# Patient Record
Sex: FEMALE | Race: WHITE | Employment: OTHER | URBAN - METROPOLITAN AREA
[De-identification: names, ages, dates, MRNs, and addresses within clinical notes are randomized per-mention and may not be internally consistent; named-entity substitution may affect disease eponyms.]

---

## 2017-02-06 PROBLEM — R07.9 CHEST PAIN: Status: ACTIVE | Noted: 2017-02-06

## 2017-07-03 ENCOUNTER — APPOINTMENT (OUTPATIENT)
Dept: GENERAL RADIOLOGY | Age: 77
End: 2017-07-03
Payer: MEDICARE

## 2017-07-03 ENCOUNTER — HOSPITAL ENCOUNTER (EMERGENCY)
Age: 77
Discharge: HOME | End: 2017-07-03
Attending: STUDENT IN AN ORGANIZED HEALTH CARE EDUCATION/TRAINING PROGRAM
Payer: MEDICARE

## 2017-07-03 VITALS
RESPIRATION RATE: 16 BRPM | HEART RATE: 86 BPM | DIASTOLIC BLOOD PRESSURE: 67 MMHG | BODY MASS INDEX: 31.89 KG/M2 | OXYGEN SATURATION: 97 % | TEMPERATURE: 98 F | SYSTOLIC BLOOD PRESSURE: 101 MMHG | WEIGHT: 180 LBS | HEIGHT: 63 IN

## 2017-07-03 DIAGNOSIS — M54.6 ACUTE MIDLINE THORACIC BACK PAIN: Primary | ICD-10-CM

## 2017-07-03 PROCEDURE — 99283 EMERGENCY DEPT VISIT LOW MDM: CPT | Performed by: PHYSICIAN ASSISTANT

## 2017-07-03 PROCEDURE — 72070 X-RAY EXAM THORAC SPINE 2VWS: CPT

## 2017-07-03 RX ORDER — CYCLOBENZAPRINE HCL 5 MG
5 TABLET ORAL 2 TIMES DAILY
Qty: 14 TAB | Refills: 0 | Status: SHIPPED | OUTPATIENT
Start: 2017-07-03 | End: 2017-07-10

## 2017-07-03 NOTE — ED TRIAGE NOTES
Per EMS, patient fell Friday in her yard. Denies LOC. C/o upper back pain currently. Pain worse with movement.

## 2017-07-03 NOTE — ED PROVIDER NOTES
Patient is a 76 y.o. female presenting with back pain. The history is provided by the patient. Back Pain    This is a new problem. Episode onset: 4 days ago  The problem has not changed since onset. The problem occurs constantly. Patient reports not work related injury. The pain is associated with a fall. The pain is present in the thoracic spine. The quality of the pain is described as aching. The pain does not radiate. The pain is at a severity of 6/10. The pain is moderate. The symptoms are aggravated by certain positions. Worse during: worse with  movement  Pertinent negatives include no paresis, no tingling and no weakness. She has tried bed rest (tylenol ) for the symptoms. The treatment provided mild relief. Risk factors include a sedentary lifestyle.         Past Medical History:   Diagnosis Date    Anxiety     no treatment    Arthritis     generalized    Chronic kidney disease     monitored by Nephrologist    Chronic pain     back    Claustrophobia     Hypertension     managed with medication     Irregular heart beat      skips a beat \" every now and then\"-- no meds    Obesity     BMI 35.9    Osteoporosis     managed with medication     PUD (peptic ulcer disease)     no recent episodes, changed medication routine with no further problems    Type 2 diabetes mellitus ([address] Dx     oral hypoglycemic/ Avg / low BS symptoms @ 88       Past Surgical History:   Procedure Laterality Date    HX AMPUTATION Left     cut great toe off with     HX BLEPHAROPLASTY Bilateral     HX CATARACT REMOVAL Bilateral     bilat, \"bubble\" in eye removed    HX  SECTION  /1964    x 2    HX HEMORRHOIDECTOMY      HX KNEE REPLACEMENT Bilateral     HX KYPHOPLASTY      HX WRIST FRACTURE TX Bilateral     surgical repair    [place] COLSC FLX W/REMOVAL LESION BY HOT BX FORCEPS  2013    polyps removed         Family History:   Problem Relation Age of Onset    Cancer Mother     Diabetes Mother     Heart Disease Father     Diabetes Sister    Mac Dacosta Sister     Diabetes Brother     Diabetes Sister     Diabetes Sister     Diabetes Sister     Cancer Sister     Malignant Hyperthermia Neg Hx     Pseudocholinesterase Deficiency Neg Hx     Delayed Awakening Neg Hx     Post-op Nausea/Vomiting Neg Hx     Emergence Delirium Neg Hx     Post-op Cognitive Dysfunction Neg Hx     Other Neg Hx        Social History     Social History    Marital status:      Spouse name: N/A    Number of children: N/A    Years of education: N/A     Occupational History    Not on file. Social History Main Topics    Smoking status: Former Smoker     Packs/day: 0.50     Years: 20.00     Quit date: 1/1/1976    Smokeless tobacco: Never Used    Alcohol use No    Drug use: No    Sexual activity: Not on file     Other Topics Concern    Not on file     Social History Narrative         ALLERGIES: Ibuprofen and Sulfa (sulfonamide antibiotics)    Review of Systems   Musculoskeletal: Positive for back pain. Neurological: Negative for tingling and weakness. All other systems reviewed and are negative. Vitals:    07/03/17 1410   BP: 100/64   Pulse: 94   Resp: 17   Temp: 98.3 °F (36.8 °C)   SpO2: 95%   Weight: 81.6 kg (180 lb)   Height: 5' 3\" (1.6 m)            Physical Exam   Constitutional: She is oriented to person, place, and time. She appears well-developed and well-nourished. No distress. HENT:   Head: Normocephalic and atraumatic. Eyes: Conjunctivae and EOM are normal. Pupils are equal, round, and reactive to light. Neck: Normal range of motion. Neck supple. Cardiovascular: Normal rate and regular rhythm. Pulmonary/Chest: Effort normal and breath sounds normal. No respiratory distress. She has no wheezes. Abdominal: Soft. Bowel sounds are normal.   Musculoskeletal: She exhibits tenderness. She exhibits no edema.         Back:    Neurological: She is alert and oriented to person, place, and time. Skin: Skin is warm. Nursing note and vitals reviewed.        MDM  Number of Diagnoses or Management Options  Diagnosis management comments: t spine x rays negative, pt has at home norco will add robaxin to curent meds and see pmd for recheck        Amount and/or Complexity of Data Reviewed  Tests in the radiology section of CPT®: ordered and reviewed  Review and summarize past medical records: yes    Risk of Complications, Morbidity, and/or Mortality  Presenting problems: moderate  Diagnostic procedures: low  Management options: low    Patient Progress  Patient progress: improved    ED Course       Procedures

## 2017-07-03 NOTE — DISCHARGE INSTRUCTIONS
Back Pain: Care Instructions  Your Care Instructions    Back pain has many possible causes. It is often related to problems with muscles and ligaments of the back. It may also be related to problems with the nerves, discs, or bones of the back. Moving, lifting, standing, sitting, or sleeping in an awkward way can strain the back. Sometimes you don't notice the injury until later. Arthritis is another common cause of back pain. Although it may hurt a lot, back pain usually improves on its own within several weeks. Most people recover in 12 weeks or less. Using good home treatment and being careful not to stress your back can help you feel better sooner. Follow-up care is a key part of your treatment and safety. Be sure to make and go to all appointments, and call your doctor if you are having problems. Its also a good idea to know your test results and keep a list of the medicines you take. How can you care for yourself at home? · Sit or lie in positions that are most comfortable and reduce your pain. Try one of these positions when you lie down:  ¨ Lie on your back with your knees bent and supported by large pillows. ¨ Lie on the floor with your legs on the seat of a sofa or chair. Blima Naegeli on your side with your knees and hips bent and a pillow between your legs. ¨ Lie on your stomach if it does not make pain worse. · Do not sit up in bed, and avoid soft couches and twisted positions. Bed rest can help relieve pain at first, but it delays healing. Avoid bed rest after the first day of back pain. · Change positions every 30 minutes. If you must sit for long periods of time, take breaks from sitting. Get up and walk around, or lie in a comfortable position. · Try using a heating pad on a low or medium setting for 15 to 20 minutes every 2 or 3 hours. Try a warm shower in place of one session with the heating pad. · You can also try an ice pack for 10 to 15 minutes every 2 to 3 hours.  Put a thin cloth between the ice pack and your skin. · Take pain medicines exactly as directed. ¨ If the doctor gave you a prescription medicine for pain, take it as prescribed. ¨ If you are not taking a prescription pain medicine, ask your doctor if you can take an over-the-counter medicine. · Take short walks several times a day. You can start with 5 to 10 minutes, 3 or 4 times a day, and work up to longer walks. Walk on level surfaces and avoid hills and stairs until your back is better. · Return to work and other activities as soon as you can. Continued rest without activity is usually not good for your back. · To prevent future back pain, do exercises to stretch and strengthen your back and stomach. Learn how to use good posture, safe lifting techniques, and proper body mechanics. When should you call for help? Call your doctor now or seek immediate medical care if:  · You have new or worsening numbness in your legs. · You have new or worsening weakness in your legs. (This could make it hard to stand up.)  · You lose control of your bladder or bowels. Watch closely for changes in your health, and be sure to contact your doctor if:  · Your pain gets worse. · You are not getting better after 2 weeks. Where can you learn more? Go to http://cesar-rohan.info/. Enter I748 in the search box to learn more about \"Back Pain: Care Instructions. \"  Current as of: March 21, 2017  Content Version: 11.3  © 9852-1111 [employer]. Care instructions adapted under license by [employer] (which disclaims liability or warranty for this information). If you have questions about a medical condition or this instruction, always ask your healthcare professional. [address] any warranty or liability for your use of this information.

## 2017-07-06 ENCOUNTER — HOSPITAL ENCOUNTER (OUTPATIENT)
Dept: MRI IMAGING | Age: 77
Discharge: HOME | End: 2017-07-06
Attending: NURSE PRACTITIONER
Payer: MEDICARE

## 2017-07-06 DIAGNOSIS — M54.50 LOW BACK PAIN: ICD-10-CM

## 2017-07-06 DIAGNOSIS — M48.50XA COMPRESSION FRACTURE OF SPINE (HCC): ICD-10-CM

## 2017-07-06 PROCEDURE — 72148 MRI LUMBAR SPINE W/O DYE: CPT

## 2017-08-07 PROBLEM — R41.3 INTERMITTENT MEMORY LOSS: Status: ACTIVE | Noted: 2017-08-07

## 2017-09-18 ENCOUNTER — HOSPITAL ENCOUNTER (OUTPATIENT)
Dept: CT IMAGING | Age: 77
Discharge: HOME | End: 2017-09-18
Attending: FAMILY MEDICINE
Payer: MEDICARE

## 2017-09-18 VITALS — HEIGHT: 63 IN | WEIGHT: 180 LBS | BODY MASS INDEX: 31.89 KG/M2

## 2017-09-18 DIAGNOSIS — N28.89 RENAL MASS, LEFT: ICD-10-CM

## 2017-09-18 LAB — CREAT BLD-MCNC: 1.4 MG/DL (ref 0.8–1.5)

## 2017-09-18 PROCEDURE — 74011250636 HC RX REV CODE- 250/636

## 2017-09-18 PROCEDURE — 74011000258 HC RX REV CODE- 258

## 2017-09-18 PROCEDURE — 74011636320 HC RX REV CODE- 636/320

## 2017-09-18 PROCEDURE — 74178 CT ABD&PLV WO CNTR FLWD CNTR: CPT

## 2017-09-18 PROCEDURE — 82565 ASSAY OF CREATININE: CPT

## 2017-09-18 RX ORDER — SODIUM CHLORIDE 0.9 % (FLUSH) 0.9 %
10 SYRINGE (ML) INJECTION
Status: COMPLETED | OUTPATIENT
Start: 2017-09-18 | End: 2017-09-18

## 2017-09-18 RX ORDER — SODIUM CHLORIDE 9 MG/ML
82 INJECTION, SOLUTION INTRAVENOUS ONCE
Status: COMPLETED | OUTPATIENT
Start: 2017-09-18 | End: 2017-09-18

## 2017-09-18 RX ADMIN — IOPAMIDOL 100 ML: 755 INJECTION, SOLUTION INTRAVENOUS at 11:57

## 2017-09-18 RX ADMIN — Medication 10 ML: at 11:57

## 2017-09-18 RX ADMIN — SODIUM CHLORIDE 100 ML: 900 INJECTION, SOLUTION INTRAVENOUS at 11:57

## 2017-09-18 RX ADMIN — SODIUM CHLORIDE 82 ML/HR: 900 INJECTION, SOLUTION INTRAVENOUS at 08:30

## 2017-09-18 NOTE — PROGRESS NOTES
Patient Discharged to home post CT IV hydration . Patient to private vehicle via wheelchair . Patient son to drive home . No c/o pain at time of dc .

## 2017-09-19 NOTE — PROGRESS NOTES
Advise pt creatinine clearance is 39, which while not perfect is still acceptable.   Will review with pt at office visit if needed, this lab was done for testing purposes (as CT scan)

## 2018-02-07 PROBLEM — E11.21 TYPE 2 DIABETES MELLITUS WITH NEPHROPATHY (HCC): Status: ACTIVE | Noted: 2018-02-07

## 2020-03-10 ENCOUNTER — HOSPITAL ENCOUNTER (OUTPATIENT)
Dept: MAMMOGRAPHY | Age: 80
Discharge: HOME | End: 2020-03-10
Attending: FAMILY MEDICINE
Payer: MEDICARE

## 2020-03-10 DIAGNOSIS — M81.0 OSTEOPOROSIS, UNSPECIFIED OSTEOPOROSIS TYPE, UNSPECIFIED PATHOLOGICAL FRACTURE PRESENCE: ICD-10-CM

## 2020-03-10 PROCEDURE — 77080 DXA BONE DENSITY AXIAL: CPT

## 2020-04-07 PROBLEM — M79.672 LEFT FOOT PAIN: Status: ACTIVE | Noted: 2020-04-07

## 2020-11-23 PROBLEM — R39.81 URINARY INCONTINENCE DUE TO COGNITIVE IMPAIRMENT: Status: ACTIVE | Noted: 2020-11-23

## 2020-11-23 PROBLEM — R32 INCONTINENCE IN FEMALE: Status: ACTIVE | Noted: 2020-11-23

## 2021-05-25 ENCOUNTER — HOSPITAL ENCOUNTER (OUTPATIENT)
Dept: MAMMOGRAPHY | Age: 81
Discharge: HOME | End: 2021-05-25
Attending: FAMILY MEDICINE
Payer: MEDICARE

## 2021-05-25 DIAGNOSIS — M81.0 OSTEOPOROSIS WITHOUT CURRENT PATHOLOGICAL FRACTURE, UNSPECIFIED OSTEOPOROSIS TYPE: ICD-10-CM

## 2021-05-25 PROCEDURE — 77080 DXA BONE DENSITY AXIAL: CPT

## 2021-09-07 PROBLEM — E11.22 CKD STAGE 3 DUE TO TYPE 2 DIABETES MELLITUS (HCC): Status: ACTIVE | Noted: 2021-09-07

## 2021-09-07 PROBLEM — N18.30 CKD STAGE 3 DUE TO TYPE 2 DIABETES MELLITUS (HCC): Status: ACTIVE | Noted: 2021-09-07

## 2021-09-07 PROBLEM — F51.01 PRIMARY INSOMNIA: Status: ACTIVE | Noted: 2021-09-07

## 2021-09-07 PROBLEM — M81.0 OSTEOPOROSIS: Status: ACTIVE | Noted: 2021-09-07

## 2021-09-07 PROBLEM — F41.9 ANXIETY: Status: ACTIVE | Noted: 2021-09-07

## 2021-09-07 PROBLEM — B37.2 YEAST INFECTION OF THE SKIN: Status: ACTIVE | Noted: 2021-09-07

## 2021-10-06 ENCOUNTER — HOSPITAL ENCOUNTER (OUTPATIENT)
Dept: LAB | Age: 81
Discharge: HOME | End: 2021-10-06
Payer: MEDICARE

## 2021-10-06 DIAGNOSIS — I10 PRIMARY HYPERTENSION: ICD-10-CM

## 2021-10-06 DIAGNOSIS — R60.0 LOCALIZED EDEMA: ICD-10-CM

## 2021-10-06 PROBLEM — I45.10 RBBB: Status: ACTIVE | Noted: 2021-10-06

## 2021-10-06 LAB
ALBUMIN SERPL-MCNC: 3.4 G/DL (ref 3.2–4.6)
ALBUMIN/GLOB SERPL: 0.8 {RATIO} (ref 1.2–3.5)
ALP SERPL-CCNC: 57 U/L (ref 50–136)
ALT SERPL-CCNC: 25 U/L (ref 12–65)
ANION GAP SERPL CALC-SCNC: 7 MMOL/L (ref 7–16)
AST SERPL-CCNC: 11 U/L (ref 15–37)
BILIRUB SERPL-MCNC: 0.2 MG/DL (ref 0.2–1.1)
BNP SERPL-MCNC: 315 PG/ML
BUN SERPL-MCNC: 20 MG/DL (ref 8–23)
CALCIUM SERPL-MCNC: 10.1 MG/DL (ref 8.3–10.4)
CHLORIDE SERPL-SCNC: 109 MMOL/L (ref 98–107)
CO2 SERPL-SCNC: 24 MMOL/L (ref 21–32)
CREAT SERPL-MCNC: 1.23 MG/DL (ref 0.6–1)
GLOBULIN SER CALC-MCNC: 4.3 G/DL (ref 2.3–3.5)
GLUCOSE SERPL-MCNC: 255 MG/DL (ref 65–100)
POTASSIUM SERPL-SCNC: 3.9 MMOL/L (ref 3.5–5.1)
PROT SERPL-MCNC: 7.7 G/DL (ref 6.3–8.2)
SODIUM SERPL-SCNC: 140 MMOL/L (ref 136–145)

## 2021-10-06 PROCEDURE — 36415 COLL VENOUS BLD VENIPUNCTURE: CPT

## 2021-10-06 PROCEDURE — 83880 ASSAY OF NATRIURETIC PEPTIDE: CPT

## 2021-10-06 PROCEDURE — 80053 COMPREHEN METABOLIC PANEL: CPT

## 2022-03-18 PROBLEM — R32 INCONTINENCE IN FEMALE: Status: ACTIVE | Noted: 2020-11-23

## 2022-03-18 PROBLEM — R39.81 URINARY INCONTINENCE DUE TO COGNITIVE IMPAIRMENT: Status: ACTIVE | Noted: 2020-11-23

## 2022-03-19 PROBLEM — F41.9 ANXIETY: Status: ACTIVE | Noted: 2021-09-07

## 2022-03-19 PROBLEM — E11.22 CKD STAGE 3 DUE TO TYPE 2 DIABETES MELLITUS (HCC): Status: ACTIVE | Noted: 2021-09-07

## 2022-03-19 PROBLEM — R41.3 INTERMITTENT MEMORY LOSS: Status: ACTIVE | Noted: 2017-08-07

## 2022-03-19 PROBLEM — M81.0 OSTEOPOROSIS: Status: ACTIVE | Noted: 2021-09-07

## 2022-03-19 PROBLEM — F51.01 PRIMARY INSOMNIA: Status: ACTIVE | Noted: 2021-09-07

## 2022-03-19 PROBLEM — E11.21 TYPE 2 DIABETES MELLITUS WITH NEPHROPATHY (HCC): Status: ACTIVE | Noted: 2018-02-07

## 2022-03-19 PROBLEM — I45.10 RBBB: Status: ACTIVE | Noted: 2021-10-06

## 2022-03-19 PROBLEM — B37.2 YEAST INFECTION OF THE SKIN: Status: ACTIVE | Noted: 2021-09-07

## 2022-03-19 PROBLEM — M79.672 LEFT FOOT PAIN: Status: ACTIVE | Noted: 2020-04-07

## 2022-03-19 PROBLEM — R07.9 CHEST PAIN: Status: ACTIVE | Noted: 2017-02-06

## 2022-03-19 PROBLEM — N18.30 CKD STAGE 3 DUE TO TYPE 2 DIABETES MELLITUS (HCC): Status: ACTIVE | Noted: 2021-09-07

## 2022-04-21 PROCEDURE — 96372 THER/PROPH/DIAG INJ SC/IM: CPT

## 2022-04-21 PROCEDURE — 99284 EMERGENCY DEPT VISIT MOD MDM: CPT

## 2022-04-21 PROCEDURE — 99281 EMR DPT VST MAYX REQ PHY/QHP: CPT

## 2022-04-22 ENCOUNTER — HOSPITAL ENCOUNTER (EMERGENCY)
Age: 82
Discharge: HOME | End: 2022-04-22
Attending: EMERGENCY MEDICINE
Payer: MEDICARE

## 2022-04-22 ENCOUNTER — APPOINTMENT (OUTPATIENT)
Dept: GENERAL RADIOLOGY | Age: 82
End: 2022-04-22
Attending: EMERGENCY MEDICINE
Payer: MEDICARE

## 2022-04-22 VITALS
OXYGEN SATURATION: 96 % | WEIGHT: 180 LBS | DIASTOLIC BLOOD PRESSURE: 82 MMHG | RESPIRATION RATE: 20 BRPM | SYSTOLIC BLOOD PRESSURE: 165 MMHG | HEART RATE: 101 BPM | BODY MASS INDEX: 31.89 KG/M2 | HEIGHT: 63 IN | TEMPERATURE: 98.3 F
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
## 2022-04-22 LAB
GLUCOSE BLD STRIP.AUTO-MCNC: 345 MG/DL (ref 65–100)
GLUCOSE BLD STRIP.AUTO-MCNC: 356 MG/DL (ref 65–100)
SERVICE CMNT-IMP: ABNORMAL
SERVICE CMNT-IMP: ABNORMAL

## 2022-04-22 PROCEDURE — 73060 X-RAY EXAM OF HUMERUS: CPT

## 2022-04-22 PROCEDURE — 74011636637 HC RX REV CODE- 636/637: Performed by: EMERGENCY MEDICINE

## 2022-04-22 PROCEDURE — 72040 X-RAY EXAM NECK SPINE 2-3 VW: CPT

## 2022-04-22 PROCEDURE — 72070 X-RAY EXAM THORAC SPINE 2VWS: CPT

## 2022-04-22 PROCEDURE — 73522 X-RAY EXAM HIPS BI 3-4 VIEWS: CPT

## 2022-04-22 PROCEDURE — 71111 X-RAY EXAM RIBS/CHEST4/> VWS: CPT

## 2022-04-22 PROCEDURE — 72100 X-RAY EXAM L-S SPINE 2/3 VWS: CPT

## 2022-04-22 PROCEDURE — 82962 GLUCOSE BLOOD TEST: CPT

## 2022-04-22 PROCEDURE — 74011250637 HC RX REV CODE- 250/637: Performed by: EMERGENCY MEDICINE

## 2022-04-22 RX ORDER — HYDROCODONE BITARTRATE AND ACETAMINOPHEN 10; 325 MG/1; MG/1
1 TABLET ORAL ONCE
Status: COMPLETED | OUTPATIENT
Start: 2022-04-22 | End: 2022-04-22

## 2022-04-22 RX ORDER — INSULIN GLARGINE 100 [IU]/ML
40 INJECTION, SOLUTION SUBCUTANEOUS
Status: COMPLETED | OUTPATIENT
Start: 2022-04-22 | End: 2022-04-22

## 2022-04-22 RX ADMIN — HYDROCODONE BITARTRATE AND ACETAMINOPHEN 1 TABLET: 10; 325 TABLET ORAL at 07:23

## 2022-04-22 RX ADMIN — INSULIN GLARGINE 40 UNITS: 100 INJECTION, SOLUTION SUBCUTANEOUS at 07:44

## 2022-04-22 NOTE — ED TRIAGE NOTES
Pt presents to the ED c/o fall while walking with her walker. States just mistepped, falling onto the ground and denies blood thinner use. Pt denies hitting and LOC. States it took her 3 hours to get into the house to call someone.
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
## 2022-04-22 NOTE — ED NOTES
I have reviewed discharge instructions with the patient. The patient verbalized understanding. Patient left ED via Discharge Method: stretcher to Home with [facility] for questions and clarification provided. Patient given 0 scripts. To continue your aftercare when you leave the hospital, you may receive an automated call from our care team to check in on how you are doing. This is a free service and part of our promise to provide the best care and service to meet your aftercare needs.  If you have questions, or wish to unsubscribe from this service please call [phone]. Thank you for Choosing our [address] [place] [address] Emergency Department.

## 2022-06-15 ENCOUNTER — TELEPHONE (OUTPATIENT)
Dept: PRIMARY CARE CLINIC | Facility: CLINIC | Age: 82
End: 2022-06-15

## 2022-06-15 DIAGNOSIS — E11.21 TYPE 2 DIABETES MELLITUS WITH NEPHROPATHY (HCC): Primary | ICD-10-CM

## 2022-06-15 NOTE — TELEPHONE ENCOUNTER
----- Message from Kai Finley sent at 6/14/2022  9:18 AM EDT -----  Subject: Refill Request    QUESTIONS  Name of Medication? insulin detemir (LEVEMIR) 100 UNIT/ML injection pen  Patient-reported dosage and instructions? 100 unit/ml injection pen   How many days do you have left? 1  Preferred Pharmacy? [facility]Y #[id number]  Pharmacy phone number (if available)? [phone]  ---------------------------------------------------------------------------  --------------,  Name of Medication? diazePAM (VALIUM) 5 MG tablet  Patient-reported dosage and instructions? 5 mg every 12 hours   How many days do you have left? 10  Preferred Pharmacy? [facility]/PHARMACY #[id number]  Pharmacy phone number (if available)? [phone]  ---------------------------------------------------------------------------  --------------,  Name of Medication? temazepam (RESTORIL) 15 MG capsule  Patient-reported dosage and instructions? 15 mg once at night   How many days do you have left? 7  Preferred Pharmacy? [facility]/PHARMACY #[id number]  Pharmacy phone number (if available)? [phone]  Additional Information for Provider? pt is needing refills stated that she   will run out before her appointment as well need test strips   ---------------------------------------------------------------------------  --------------  CALL BACK INFO  What is the best way for the office to contact you? OK to leave message on   voicemail  Preferred Call Back Phone Number? [phone]  ---------------------------------------------------------------------------  --------------  SCRIPT ANSWERS  Relationship to Patient?  Self

## 2022-06-15 NOTE — TELEPHONE ENCOUNTER
Insulin sent. Patient has an appt tomorrow. The other meds patient is requesting are not completely out. Will refill those tomorrow at appt.

## 2022-06-16 ENCOUNTER — OFFICE VISIT (OUTPATIENT)
Dept: PRIMARY CARE CLINIC | Facility: CLINIC | Age: 82
End: 2022-06-16
Payer: MEDICARE

## 2022-06-16 VITALS
HEIGHT: 63 IN | SYSTOLIC BLOOD PRESSURE: 160 MMHG | OXYGEN SATURATION: 97 % | HEART RATE: 59 BPM | DIASTOLIC BLOOD PRESSURE: 75 MMHG | WEIGHT: 180 LBS | TEMPERATURE: 97.1 F | BODY MASS INDEX: 31.89 KG/M2

## 2022-06-16 DIAGNOSIS — F51.01 PRIMARY INSOMNIA: ICD-10-CM

## 2022-06-16 DIAGNOSIS — M81.0 OSTEOPOROSIS, UNSPECIFIED OSTEOPOROSIS TYPE, UNSPECIFIED PATHOLOGICAL FRACTURE PRESENCE: ICD-10-CM

## 2022-06-16 DIAGNOSIS — F41.9 ANXIETY: ICD-10-CM

## 2022-06-16 DIAGNOSIS — E55.9 VITAMIN D DEFICIENCY: ICD-10-CM

## 2022-06-16 DIAGNOSIS — I10 PRIMARY HYPERTENSION: ICD-10-CM

## 2022-06-16 DIAGNOSIS — Z89.412 STATUS POST AMPUTATION OF LEFT GREAT TOE (HCC): ICD-10-CM

## 2022-06-16 DIAGNOSIS — E11.21 TYPE 2 DIABETES MELLITUS WITH NEPHROPATHY (HCC): Primary | ICD-10-CM

## 2022-06-16 DIAGNOSIS — R41.3 INTERMITTENT MEMORY LOSS: ICD-10-CM

## 2022-06-16 DIAGNOSIS — M79.601 RIGHT ARM PAIN: ICD-10-CM

## 2022-06-16 DIAGNOSIS — G89.29 OTHER CHRONIC PAIN: ICD-10-CM

## 2022-06-16 DIAGNOSIS — N18.30 CKD STAGE 3 DUE TO TYPE 2 DIABETES MELLITUS (HCC): ICD-10-CM

## 2022-06-16 DIAGNOSIS — E11.22 CKD STAGE 3 DUE TO TYPE 2 DIABETES MELLITUS (HCC): ICD-10-CM

## 2022-06-16 DIAGNOSIS — E11.21 TYPE 2 DIABETES MELLITUS WITH NEPHROPATHY (HCC): ICD-10-CM

## 2022-06-16 DIAGNOSIS — I45.10 RBBB: ICD-10-CM

## 2022-06-16 PROCEDURE — 99214 OFFICE O/P EST MOD 30 MIN: CPT | Performed by: FAMILY MEDICINE

## 2022-06-16 PROCEDURE — 3046F HEMOGLOBIN A1C LEVEL >9.0%: CPT | Performed by: FAMILY MEDICINE

## 2022-06-16 PROCEDURE — 1123F ACP DISCUSS/DSCN MKR DOCD: CPT | Performed by: FAMILY MEDICINE

## 2022-06-16 RX ORDER — QUINAPRIL 40 MG/1
40 TABLET ORAL DAILY
Qty: 90 TABLET | Refills: 3 | Status: SHIPPED | OUTPATIENT
Start: 2022-06-16

## 2022-06-16 RX ORDER — LANCETS 30 GAUGE
EACH MISCELLANEOUS
Qty: 100 EACH | Refills: 11 | Status: SHIPPED | OUTPATIENT
Start: 2022-06-16

## 2022-06-16 RX ORDER — TEMAZEPAM 15 MG/1
15 CAPSULE ORAL NIGHTLY PRN
Qty: 30 CAPSULE | Refills: 2 | Status: SHIPPED | OUTPATIENT
Start: 2022-06-16 | End: 2022-10-04 | Stop reason: SDUPTHER

## 2022-06-16 RX ORDER — DIAZEPAM 5 MG/1
5 TABLET ORAL EVERY 12 HOURS PRN
Qty: 60 TABLET | Refills: 2 | Status: SHIPPED | OUTPATIENT
Start: 2022-06-16 | End: 2022-09-16 | Stop reason: SDUPTHER

## 2022-06-16 RX ORDER — AMLODIPINE BESYLATE 2.5 MG/1
2.5 TABLET ORAL DAILY
Qty: 90 TABLET | Refills: 1 | Status: SHIPPED | OUTPATIENT
Start: 2022-06-16 | End: 2022-09-16 | Stop reason: ALTCHOICE

## 2022-06-16 RX ORDER — MAGNESIUM OXIDE 400 MG/1
400 TABLET ORAL 2 TIMES DAILY
Qty: 60 TABLET | Refills: 5 | Status: SHIPPED | OUTPATIENT
Start: 2022-06-16

## 2022-06-16 RX ORDER — ALENDRONATE SODIUM 70 MG/1
70 TABLET ORAL
Qty: 12 TABLET | Refills: 3 | Status: SHIPPED | OUTPATIENT
Start: 2022-06-16 | End: 2022-09-16 | Stop reason: ALTCHOICE

## 2022-06-16 ASSESSMENT — PATIENT HEALTH QUESTIONNAIRE - PHQ9
SUM OF ALL RESPONSES TO PHQ QUESTIONS 1-9: 0
SUM OF ALL RESPONSES TO PHQ QUESTIONS 1-9: 0
2. FEELING DOWN, DEPRESSED OR HOPELESS: 0
1. LITTLE INTEREST OR PLEASURE IN DOING THINGS: 0
SUM OF ALL RESPONSES TO PHQ9 QUESTIONS 1 & 2: 0
SUM OF ALL RESPONSES TO PHQ QUESTIONS 1-9: 0
SUM OF ALL RESPONSES TO PHQ QUESTIONS 1-9: 0

## 2022-06-16 NOTE — PROGRESS NOTES
Zoila Lucia MD  [address] [place] [address].  Denzel Parada  Ph No:  [phone]  Fax:  [phone]:  Chief Complaint   Patient presents with    Fall     Pt fell 2 months ago. Pt has RT arm pain and says she is always in pain.  Discuss Labs     Pt wants to have labs drawn today. HISTORY OF PRESENT ILLNESS:  Ms. Sony Prieto is a 78 y.o. female. Pt presents with caregiver for follow up visit, with multiple medical concerns. Pt is a diabetic and reports she is almost out of insulin and some insulin supplies. PT is sent diabetic test strips, delica type. Pt is also given levemir insulin pen for diabetes management, hgba1c lab is pending results. Pt is advised to go back to dermatologist for freezing of benign warts. Pt is given multiple medication refills as follows: restoril for sleep/insomnia. Pt is give accupril for BP management. BP elevated at 160/75. Adding amlodipine 2.5mg po daily dose for BP management. Pt is given valium for general anxiety disorder, taking twice daily, stable. Pt reports weakness in right arm, unable to lift arm without using left arm to . Pt reports pain in whole of right arm. Pt is sent to [facility] orthopaedic for help with right arm weakness, right arm pain, uncertain etiology. Pt says may have bursitis, had in past and had injection. Pt is to have labs drawn today, cmp, lipid, hgba1c, vit d, microalbumin, tsh, free t4. Results should be available in a few days to help with medication dosing (diabetes). Pt is to RTC in 3 months, telemed visit, due to transportation problems, and in 6 months will return to clinic, labs indicated, cmp, lipid, cbc, tsh, free t4, hgba1c, microalbumin, vit d.       HISTORY:  Allergies   Allergen Reactions    Ibuprofen Other (See Comments)     Stomach problems    Sulfa Antibiotics Other (See Comments)     Told allergic as child     Past Medical History:   Diagnosis Date  Anxiety     no treatment    Arthritis     generalized    Chronic kidney disease     monitored by Nephrologist    Chronic pain     back    Claustrophobia     Hypertension     managed with medication     Irregular heart beat      skips a beat \" every now and then\"-- no meds    Obesity     BMI 35.9    Osteoporosis     managed with medication     PUD (peptic ulcer disease)     no recent episodes, changed medication routine with no further problems    Type 2 diabetes mellitus ([facility] [address] Dx     oral hypoglycemic/ Avg / low BS symptoms @ 88     Past Surgical History:   Procedure Laterality Date    AMPUTATION Left     cut great toe off with     BLEPHAROPLASTY Bilateral     CATARACT REMOVAL Bilateral     bilat, \"bubble\" in eye removed     SECTION  1961/1964    x 2    COLSC FLX W/REMOVAL LESION BY HOT BX FORCEPS  2013    polyps removed    FIXATION KYPHOPLASTY      HEMORRHOID SURGERY      TOTAL KNEE ARTHROPLASTY Bilateral     WRIST FRACTURE SURGERY Bilateral     surgical repair     Family History   Problem Relation Age of Onset    Post-op Nausea/Vomiting Neg Hx     Pseudochol.  Deficiency Neg Hx     Delayed Awakening Neg Hx     Malig Hypertherm Neg Hx     Cancer Sister     Diabetes Sister     Diabetes Sister     Diabetes Sister     Diabetes Brother     Cancer Sister     Diabetes Sister     Heart Disease Father     Diabetes Mother     Cancer Mother     Other Neg Hx     Post-op Cognitive Dysfunction Neg Hx     Emergence Delirium Neg Hx      Social History     Socioeconomic History    Marital status:      Spouse name: Not on file    Number of children: Not on file    Years of education: Not on file    Highest education level: Not on file   Occupational History    Not on file   Tobacco Use    Smoking status: Former Smoker     Packs/day: 0.50     Quit date: 1976     Years since quittin.4    Smokeless tobacco: Never Used   Substance and Sexual Activity    Alcohol use: No    Drug use: No    Sexual activity: Not on file   Other Topics Concern    Not on file   Social History Narrative    Not on file     Social Determinants of Health     Financial Resource Strain:     Difficulty of Paying Living Expenses: Not on file   Food Insecurity:     Worried About Running Out of Food in the Last Year: Not on file    Emma of Food in the Last Year: Not on file   Transportation Needs:     Lack of Transportation (Medical): Not on file    Lack of Transportation (Non-Medical): Not on file   Physical Activity:     Days of Exercise per Week: Not on file    Minutes of Exercise per Session: Not on file   Stress:     Feeling of Stress : Not on file   Social Connections:     Frequency of Communication with Friends and Family: Not on file    Frequency of Social Gatherings with Friends and Family: Not on file    Attends Sabianism Services: Not on file    Active Member of [address] [place] [address] or Organizations: Not on file    Attends Club or Organization Meetings: Not on file    Marital Status: Not on file   Intimate Partner Violence:     Fear of Current or Ex-Partner: Not on file    Emotionally Abused: Not on file    Physically Abused: Not on file    Sexually Abused: Not on file   Housing Stability:     Unable to Pay for Housing in the Last Year: Not on file    Number of [place] in the Last Year: Not on file    Unstable Housing in the Last Year: Not on file     Current Outpatient Medications   Medication Sig Dispense Refill    temazepam (RESTORIL) 15 MG capsule Take 1 capsule by mouth nightly as needed for Sleep for up to 30 days. 30 capsule 2    quinapril (ACCUPRIL) 40 MG tablet Take 1 tablet by mouth daily 90 tablet 3    magnesium oxide (MAG-OX) 400 MG tablet Take 1 tablet by mouth 2 times daily 60 tablet 5    diazePAM (VALIUM) 5 MG tablet Take 1 tablet by mouth every 12 hours as needed for Anxiety for up to 30 days.  60 tablet 2    alendronate (FOSAMAX) 70 MG tablet Take 1 tablet by mouth every 7 days 12 tablet 3    insulin detemir (LEVEMIR) 100 UNIT/ML injection pen 35 units in am and 35 units in pm, please cancel any other levemir scripts. 15 pen 5    OneTouch Delica Lancets 53T MISC Use for three times daily glucose testing, icd-10 E11.8 100 each 11    amLODIPine (NORVASC) 2.5 MG tablet Take 1 tablet by mouth daily 90 tablet 1    acetaminophen (TYLENOL) 325 MG tablet Take 325 mg by mouth every 4 hours as needed      Calcium Carbonate-Vitamin D 600-200 MG-UNIT CAPS Take once daily for supplementation Indications: osteoporosis, a condition of weak bones      fluticasone (FLONASE) 50 MCG/ACT nasal spray 2 sprays by Nasal route daily      glipiZIDE (GLUCOTROL) 10 MG tablet Take 10 mg by mouth      naloxone 4 MG/0.1ML LIQD nasal spray Use 1 spray intranasally, then discard. Repeat with new spray every 2 min as needed for opioid overdose symptoms, alternating nostrils.  nystatin (MYCOSTATIN) 320389 UNIT/GM cream Apply topically 2 times daily       No current facility-administered medications for this visit. REVIEW OF SYSTEMS:  Review of systems is as indicated in HPI otherwise negative. PHYSICAL EXAM:  Vital Signs - BP (!) 160/75 (Site: Left Upper Arm, Position: Sitting, Cuff Size: Large Adult)   Pulse 59   Temp 97.1 °F (36.2 °C)   Ht 5' 3\" (1.6 m)   Wt 180 lb (81.6 kg)   SpO2 97%   BMI 31.89 kg/m²      Physical Exam  Vitals and nursing note reviewed. Constitutional:       General: She is in acute distress. Appearance: Normal appearance. She is obese. Comments: Pt presents in wheelchair, multiple medical concerns, see HPI, diabetes review. HENT:      Head: Normocephalic and atraumatic. Nose: Nose normal.      Mouth/Throat:      Mouth: Mucous membranes are moist.      Pharynx: Oropharynx is clear. Eyes:      Extraocular Movements: Extraocular movements intact.       Conjunctiva/sclera: Conjunctivae normal. Pupils: Pupils are equal, round, and reactive to light. Cardiovascular:      Rate and Rhythm: Normal rate and regular rhythm. Pulses: Normal pulses. Heart sounds: Normal heart sounds. Pulmonary:      Effort: Pulmonary effort is normal.      Comments: Coarse breath sounds, but otherwise mostly clear to ascultation bilaterally, pt is not on 02 therapy  Abdominal:      General: Bowel sounds are normal.      Palpations: Abdomen is soft. Comments: Obese, BMI 31.89, weight 180lbs   Musculoskeletal:      Cervical back: Normal range of motion and neck supple. Comments: Mostly wheelchair bound, bilateral lower leg weakness, some pain, right arm is not moving normally, arm pain, right shoulder weakness   Skin:     General: Skin is warm and dry. Capillary Refill: Capillary refill takes 2 to 3 seconds. Findings: Lesion present. Comments: Multiple benign warts across multiple areas, including arms, face and chest.   Neurological:      General: No focal deficit present. Mental Status: She is alert and oriented to person, place, and time. Motor: Weakness present. Gait: Gait abnormal.   Psychiatric:         Behavior: Behavior normal.         Thought Content: Thought content normal.         Judgment: Judgment normal.      Comments: Health anxiety, general anxiety disorder with mild depression             LABS  No results found for this visit on 06/16/22. IMPRESSION/PLAN     Diagnosis Orders   1. Type 2 diabetes mellitus with nephropathy (HCC)  CBC with Auto Differential    Comprehensive Metabolic Panel    Hemoglobin A1C    TSH    T4, Free    Lipid Panel    insulin detemir (LEVEMIR) 100 UNIT/ML injection pen    OneTouch Delica Lancets 34C MISC    DISCONTINUED: insulin detemir (LEVEMIR) 100 UNIT/ML injection pen   2. Vitamin D deficiency  Vitamin D 25 Hydroxy   3. Status post amputation of left great toe ([facility] [address]     4.  Right arm pain  External Referral To Orthopedic Surgery 5. Primary insomnia  temazepam (RESTORIL) 15 MG capsule   6. Anxiety  diazePAM (VALIUM) 5 MG tablet   7. Primary hypertension  quinapril (ACCUPRIL) 40 MG tablet    amLODIPine (NORVASC) 2.5 MG tablet   8. RBBB     9. CKD stage 3 due [facility]     10. Osteoporosis, unspecified osteoporosis type, unspecified pathological fracture presence  alendronate (FOSAMAX) 70 MG tablet   11. Other chronic pain  magnesium oxide (MAG-OX) 400 MG tablet   12. Intermittent memory loss         No follow-up provider specified. Jaymie Pineda MD            Dictated using voice recognition software.  Proofread, but unrecognized voice recognition errors may exist.

## 2022-06-17 LAB
25(OH)D3 SERPL-MCNC: 45.4 NG/ML (ref 30–100)
ALBUMIN SERPL-MCNC: 3.8 G/DL (ref 3.2–4.6)
ALBUMIN/GLOB SERPL: 1 {RATIO} (ref 1.2–3.5)
ALP SERPL-CCNC: 52 U/L (ref 50–136)
ALT SERPL-CCNC: 22 U/L (ref 12–65)
ANION GAP SERPL CALC-SCNC: 8 MMOL/L (ref 7–16)
AST SERPL-CCNC: 13 U/L (ref 15–37)
BASOPHILS # BLD: 0.1 K/UL (ref 0–0.2)
BASOPHILS NFR BLD: 1 % (ref 0–2)
BILIRUB SERPL-MCNC: 0.4 MG/DL (ref 0.2–1.1)
BUN SERPL-MCNC: 18 MG/DL (ref 8–23)
CALCIUM SERPL-MCNC: 10.6 MG/DL (ref 8.3–10.4)
CHLORIDE SERPL-SCNC: 105 MMOL/L (ref 98–107)
CHOLEST SERPL-MCNC: 299 MG/DL
CO2 SERPL-SCNC: 27 MMOL/L (ref 21–32)
CREAT SERPL-MCNC: 1.1 MG/DL (ref 0.6–1)
DIFFERENTIAL METHOD BLD: NORMAL
EOSINOPHIL # BLD: 0.2 K/UL (ref 0–0.8)
EOSINOPHIL NFR BLD: 2 % (ref 0.5–7.8)
ERYTHROCYTE [DISTWIDTH] IN BLOOD BY AUTOMATED COUNT: 13.2 % (ref 11.9–14.6)
EST. AVERAGE GLUCOSE BLD GHB EST-MCNC: 194 MG/DL
GLOBULIN SER CALC-MCNC: 4 G/DL (ref 2.3–3.5)
GLUCOSE SERPL-MCNC: 172 MG/DL (ref 65–100)
HBA1C MFR BLD: 8.4 % (ref 4.2–6.3)
HCT VFR BLD AUTO: 40.5 % (ref 35.8–46.3)
HDLC SERPL-MCNC: 43 MG/DL (ref 40–60)
HDLC SERPL: 7 {RATIO}
HGB BLD-MCNC: 12.9 G/DL (ref 11.7–15.4)
IMM GRANULOCYTES # BLD AUTO: 0 K/UL (ref 0–0.5)
IMM GRANULOCYTES NFR BLD AUTO: 0 % (ref 0–5)
LDLC SERPL CALC-MCNC: 186.8 MG/DL
LYMPHOCYTES # BLD: 3.2 K/UL (ref 0.5–4.6)
LYMPHOCYTES NFR BLD: 36 % (ref 13–44)
MCH RBC QN AUTO: 30.5 PG (ref 26.1–32.9)
MCHC RBC AUTO-ENTMCNC: 31.9 G/DL (ref 31.4–35)
MCV RBC AUTO: 95.7 FL (ref 79.6–97.8)
MONOCYTES # BLD: 0.8 K/UL (ref 0.1–1.3)
MONOCYTES NFR BLD: 9 % (ref 4–12)
NEUTS SEG # BLD: 4.7 K/UL (ref 1.7–8.2)
NEUTS SEG NFR BLD: 52 % (ref 43–78)
NRBC # BLD: 0 K/UL (ref 0–0.2)
PLATELET # BLD AUTO: 244 K/UL (ref 150–450)
PMV BLD AUTO: 10.4 FL (ref 9.4–12.3)
POTASSIUM SERPL-SCNC: 4.3 MMOL/L (ref 3.5–5.1)
PROT SERPL-MCNC: 7.8 G/DL (ref 6.3–8.2)
RBC # BLD AUTO: 4.23 M/UL (ref 4.05–5.2)
SODIUM SERPL-SCNC: 140 MMOL/L (ref 136–145)
T4 FREE SERPL-MCNC: 0.9 NG/DL (ref 0.78–1.46)
TRIGL SERPL-MCNC: 346 MG/DL (ref 35–150)
TSH, 3RD GENERATION: 2.17 UIU/ML (ref 0.36–3.74)
VLDLC SERPL CALC-MCNC: 69.2 MG/DL (ref 6–23)
WBC # BLD AUTO: 9 K/UL (ref 4.3–11.1)

## 2022-07-05 ENCOUNTER — TELEPHONE (OUTPATIENT)
Dept: PRIMARY CARE CLINIC | Facility: CLINIC | Age: 82
End: 2022-07-05

## 2022-07-06 ENCOUNTER — TELEPHONE (OUTPATIENT)
Dept: PRIMARY CARE CLINIC | Facility: CLINIC | Age: 82
End: 2022-07-06

## 2022-09-16 ENCOUNTER — TELEMEDICINE (OUTPATIENT)
Dept: PRIMARY CARE CLINIC | Facility: CLINIC | Age: 82
End: 2022-09-16
Payer: MEDICARE

## 2022-09-16 DIAGNOSIS — E11.21 TYPE 2 DIABETES MELLITUS WITH NEPHROPATHY (HCC): ICD-10-CM

## 2022-09-16 DIAGNOSIS — F41.9 ANXIETY: ICD-10-CM

## 2022-09-16 DIAGNOSIS — N18.30 CKD STAGE 3 DUE TO TYPE 2 DIABETES MELLITUS (HCC): ICD-10-CM

## 2022-09-16 DIAGNOSIS — E11.22 CKD STAGE 3 DUE TO TYPE 2 DIABETES MELLITUS (HCC): ICD-10-CM

## 2022-09-16 DIAGNOSIS — M81.0 OSTEOPOROSIS, UNSPECIFIED OSTEOPOROSIS TYPE, UNSPECIFIED PATHOLOGICAL FRACTURE PRESENCE: ICD-10-CM

## 2022-09-16 DIAGNOSIS — I10 PRIMARY HYPERTENSION: Primary | ICD-10-CM

## 2022-09-16 PROCEDURE — 99442 PR PHYS/QHP TELEPHONE EVALUATION 11-20 MIN: CPT | Performed by: FAMILY MEDICINE

## 2022-09-16 RX ORDER — DIAZEPAM 5 MG/1
5 TABLET ORAL EVERY 12 HOURS PRN
Qty: 60 TABLET | Refills: 2 | Status: SHIPPED | OUTPATIENT
Start: 2022-09-16 | End: 2022-10-16

## 2022-09-16 RX ORDER — GLIPIZIDE 10 MG/1
10 TABLET ORAL DAILY
Qty: 90 TABLET | Refills: 3 | Status: SHIPPED | OUTPATIENT
Start: 2022-09-16

## 2022-09-16 NOTE — PROGRESS NOTES
Christiano Jiang is a 78 y.o. female evaluated via telephone on 9/16/2022 for No chief complaint on file. .        Documentation:  I communicated with the patient and/or health care decision maker about medications and follow up visit. Needs all prescriptions refills, has stopped amlodipine, has cut out salt. Pt will obtain a BP cuff so she can check her BP , pt says this med made her \"loopy in her head. \"  Pt has apparently gotten use to high BP. Pt will check BP over next two weeks and let physician know if she is worried about her BP, advised needs to be 140 or less. Pt stopped the fosamax med as \"it made my joints hurt. \"  Pt is advised of injectable medication for osteoporosis, but says she has not transportation. Pt reports UTI treated MD [place]. And is better. Pt says a CT scan was done but she does not know results. Pt is bound to home, no transportation except sister. In April, Pt fell in yard, had to scoot on back, took 5 hours to get into house. Xrays done, no broken bones. Pt advised all meds are at pharmacy, except two needed valium and glipizide and these are sent to pharmacy. .   Details of this discussion including any medical advice provided: as noted. Total Time: minutes: 11-20 minutes    Christiano Jiang was evaluated through a synchronous (real-time) audio encounter. Patient identification was verified at the start of the visit. She (or guardian if applicable) is aware that this is a billable service, which includes applicable co-pays. This visit was conducted with the patient's (and/or legal guardian's) verbal consent. She has not had a related appointment within my department in the past 7 days or scheduled within the next 24 hours. The patient was located at Home: [place] [address]. The provider was located at [facility] ([address] [place] [address]): [place] [address]..     Note: not billable if this call serves to triage the patient into an appointment for the relevant concern    Penelope Johansen MD

## 2022-09-18 ENCOUNTER — TELEPHONE (OUTPATIENT)
Dept: PRIMARY CARE CLINIC | Facility: CLINIC | Age: 82
End: 2022-09-18

## 2022-09-18 RX ORDER — HYDROCHLOROTHIAZIDE 12.5 MG/1
12.5 CAPSULE, GELATIN COATED ORAL EVERY MORNING
Qty: 90 CAPSULE | Refills: 1 | Status: SHIPPED | OUTPATIENT
Start: 2022-09-18 | End: 2022-09-20 | Stop reason: SDUPTHER

## 2022-09-20 ENCOUNTER — TELEPHONE (OUTPATIENT)
Dept: PRIMARY CARE CLINIC | Facility: CLINIC | Age: 82
End: 2022-09-20

## 2022-09-20 RX ORDER — HYDROCHLOROTHIAZIDE 12.5 MG/1
12.5 CAPSULE, GELATIN COATED ORAL EVERY MORNING
Qty: 90 CAPSULE | Refills: 1 | Status: SHIPPED | OUTPATIENT
Start: 2022-09-20 | End: 2022-09-21 | Stop reason: SDUPTHER

## 2022-09-20 NOTE — PROGRESS NOTES
Pt is given hctz for BP management to use with quinapril. Pt is to stop amlodipine and not use for BP management.

## 2022-09-21 RX ORDER — HYDROCHLOROTHIAZIDE 12.5 MG/1
12.5 CAPSULE, GELATIN COATED ORAL EVERY MORNING
Qty: 90 CAPSULE | Refills: 1 | Status: SHIPPED | OUTPATIENT
Start: 2022-09-21

## 2022-09-30 DIAGNOSIS — F51.01 PRIMARY INSOMNIA: ICD-10-CM

## 2022-10-03 ENCOUNTER — TELEPHONE (OUTPATIENT)
Dept: PRIMARY CARE CLINIC | Facility: CLINIC | Age: 82
End: 2022-10-03

## 2022-10-03 DIAGNOSIS — I10 PRIMARY HYPERTENSION: ICD-10-CM

## 2022-10-04 ENCOUNTER — TELEPHONE (OUTPATIENT)
Dept: PRIMARY CARE CLINIC | Facility: CLINIC | Age: 82
End: 2022-10-04

## 2022-10-04 DIAGNOSIS — F51.01 PRIMARY INSOMNIA: ICD-10-CM

## 2022-10-04 RX ORDER — TEMAZEPAM 15 MG/1
15 CAPSULE ORAL NIGHTLY PRN
Qty: 30 CAPSULE | Refills: 2 | Status: SHIPPED | OUTPATIENT
Start: 2022-10-04 | End: 2022-10-05 | Stop reason: SDUPTHER

## 2022-10-04 NOTE — TELEPHONE ENCOUNTER
Patient called needing refill on temazepam sent to [facility]    Pt also requesting a call back asap

## 2022-10-04 NOTE — TELEPHONE ENCOUNTER
Medication Problem  (Newest Message First)  Belen Lebron routed conversation to You 4 hours ago (1:16 PM)     Belen Lebron 4 hours ago (1:16 PM)     DS  Pt would like the temazepam sent to the [facility] on [facility]. Can that be changed please? Note                Recent Patient Communication     Last Update Reason Specialty     Today Medication Problem Primary Care     [facility] [address], Lisa Roblero Open     Today Medication Refill Primary Care     [facility] Ely Galindo Open     Today Medication Refill Primary Care     [facility] Lisa Schroeder Open     Today Medication Refill Primary Care     [facility] Ely Galindo Open     Yesterday Medication Refill Primary Care     [facility] Lisa Schroeder Open       There are additional recent communications with this patient. View the rest in Chart Review.    Routing History    Priority Sent On From To Message Type    10/4/2022  1:16 PM [facility] Patient Calls     Created by    Belen Lebron on 10/04/2022 01:15 PM

## 2022-10-04 NOTE — TELEPHONE ENCOUNTER
Medication Refill  (Newest Message First)   Xavi Avitia routed conversation to You Just now (9:44 AM)     Xavi Avitia Just now (9:44 AM)     DS  Patient called needing refill on temazepam sent to [facility]     Pt also requesting a call back asap         Note                Recent Patient Communication     Last Update Reason Specialty     Today Medication Refill Primary Care     [facility] Mike Calderon Open     Yesterday Medication Refill Primary Care     [facility] [address] Open     Yesterday Medication Refill Primary Care     [facility] [address] Open     4 days ago Medication Refill Primary Care     [facility] [address] Open     1 week ago Medication Problem Primary Care     [facility] Dil[facility] Olivas, Paulino Fuentes Closed       There are additional recent communications with this patient. View the rest in Chart Review.    Routing History    Priority Sent On From To Message Type    10/4/2022  9:44 AM [facility] Patient Calls     Created by    Xavi Avitia on 10/04/2022 09:43 AM

## 2022-10-04 NOTE — TELEPHONE ENCOUNTER
Renetta Spatz P [facility] Clinical Staff  Subject: Refill Request     QUESTIONS   Name of Medication? temazepam (RESTORIL) 15 MG capsule   Patient-reported dosage and instructions? 15 mg cap   How many days do you have left? 0   Preferred Pharmacy? [facility]Y #[id number]   Pharmacy phone number (if available)? [phone]   Additional Information for Provider? Sister in law will , Jaspal Mina. [phone]   ---------------------------------------------------------------------------   --------------   Hamida HENNING   What is the best way for the office to contact you?  OK to leave message on

## 2022-10-05 DIAGNOSIS — F51.01 PRIMARY INSOMNIA: ICD-10-CM

## 2022-10-05 RX ORDER — TEMAZEPAM 15 MG/1
15 CAPSULE ORAL NIGHTLY PRN
Qty: 30 CAPSULE | Refills: 2 | Status: SHIPPED | OUTPATIENT
Start: 2022-10-05 | End: 2022-11-04

## 2022-10-07 RX ORDER — AMLODIPINE BESYLATE 2.5 MG/1
TABLET ORAL
Qty: 90 TABLET | Refills: 1 | OUTPATIENT
Start: 2022-10-07

## 2022-10-12 RX ORDER — TEMAZEPAM 15 MG/1
CAPSULE ORAL
Qty: 30 CAPSULE | Refills: 2 | OUTPATIENT
Start: 2022-10-12

## 2022-11-03 DIAGNOSIS — I10 PRIMARY HYPERTENSION: ICD-10-CM

## 2022-11-16 RX ORDER — BLOOD SUGAR DIAGNOSTIC
STRIP MISCELLANEOUS
Qty: 300 STRIP | Refills: 3 | OUTPATIENT
Start: 2022-11-16

## 2022-11-16 RX ORDER — AMLODIPINE BESYLATE 2.5 MG/1
TABLET ORAL
Qty: 90 TABLET | Refills: 1 | OUTPATIENT
Start: 2022-11-16

## 2022-11-21 ENCOUNTER — TELEPHONE (OUTPATIENT)
Dept: ORTHOPEDIC SURGERY | Age: 82
End: 2022-11-21

## 2022-11-21 NOTE — TELEPHONE ENCOUNTER
Spoke with patient and moved her appt down a week to fit the time needed for transportation. Patient is aware of time and place 12/8 at [facility], 10:15.

## 2022-11-21 NOTE — TELEPHONE ENCOUNTER
Pt  is  scheduled  for  afternoon on  12/1. Can she  please  be moved to 10 or  11 am  so  her transportation  can  bring  her?

## 2022-12-05 DIAGNOSIS — I10 PRIMARY HYPERTENSION: ICD-10-CM

## 2022-12-08 ENCOUNTER — OFFICE VISIT (OUTPATIENT)
Dept: ORTHOPEDIC SURGERY | Age: 82
End: 2022-12-08
Payer: MEDICARE

## 2022-12-08 DIAGNOSIS — Z96.651 STATUS POST RIGHT KNEE REPLACEMENT: ICD-10-CM

## 2022-12-08 DIAGNOSIS — M54.16 LEFT LUMBAR RADICULOPATHY: ICD-10-CM

## 2022-12-08 DIAGNOSIS — Z96.652 PRESENCE OF LEFT ARTIFICIAL KNEE JOINT: Primary | ICD-10-CM

## 2022-12-08 PROCEDURE — 1123F ACP DISCUSS/DSCN MKR DOCD: CPT | Performed by: ORTHOPAEDIC SURGERY

## 2022-12-08 PROCEDURE — 99204 OFFICE O/P NEW MOD 45 MIN: CPT | Performed by: ORTHOPAEDIC SURGERY

## 2022-12-08 NOTE — PROGRESS NOTES
Name: Ab Arguello  YOB: 1940  Gender: female  MRN: [id number]    CC:   Chief Complaint   Patient presents with    Knee Pain     LT tka pain & rt knee oa pain          HPI: Ab Arguello is a 78 y.o. female with a PMHx of HTN, DM, and s/p bilateral TKA here for evaluation of left knee pain. Patient has a history of right TKA done in the 90s, but she does not remember who did it. She also has a history of a left TKA in 2012 by Dr. Rahul Gale  The pain has been present for since the surgery and is becoming worse. The pain is primarily on the Medial side. she describes the pain as a constant ache that is intermittently sharp with activity and and often feels unstable  The pain is worse with any weight bearing, rising after sitting, standing, walking, and at night, often waking them from sleep  The pain does not radiate down the leg. she denies numbness and tingling down the leg. Treatment so far has been prescription and OTC NSAIDS which have not effectively relieved pain/inflammation, activity modification, braces, Tylenol, and ibuprofen with little relief. She also has a history of prior L2 kyphoplasty with Dr. Magaly Ayoub in 2011. Allergies   Allergen Reactions    Ibuprofen Other (See Comments)     Stomach problems    Sulfa Antibiotics Other (See Comments)     Told allergic as child         Review of Systems:  As per HPI. Pertinent positives and negatives are addressed with the patient, particularly those related to musculoskeletal concerns. Non-orthopaedic concerns were referred back to the primary care physician. PHYSICAL EXAMINATION:   The patient appears their stated age and they are in no distress. There were no vitals taken for this visit. The lower extremities are as described below. Circulation is normal with palpable pedal pulses bilaterally and no edema. There is no lymph adenopathy in the popliteal or malleolar region.   The skin is without stasis disease distally bilaterally. Sensation is intact to light touch bilaterally. There is moderate tenderness to palpation over the medial joint line of the left knee(s)  The gait is noted to be with a slight trendelenburg and antalgia and and ambulates with a Walker  Range of motion is 0-110 degrees on the right and 0-100 degrees on the left. No instability noted bilaterally  Limb lengths are equal.  Quadriceps strength is excellent. Positive straight leg raise on the left  Their judgment and insight are normal.  They are oriented to time, place and person. Their memory is good and the mood and affect appropriate. X-RAY:   AP, lateral, and sunrise views of the bilateral knees are reviewed. 3 views of the knees are reveal good bone prosthetic interface with no suggestion of progressive lucency. X-ray impression:  Stable bilateral  total knee arthroplasty    X-RAY:  AP, lateral, and focused lateral lumbosacral views of the lumbar spine are reviewed. Findings: Reveal marked osteopenia. Previous compression fracture at L2 with kyphoplasty. There is some degenerative disc disease and facet arthropathy. X-ray diagnosis: Degenerative arthritis and degenerative disc disease of the lumbar spine. Interpretation: Osteopenia  He has some degenerative arthritis and degenerative disc disease of the lumbar spine. IMPRESSION:     ICD-10-CM    1. Presence of left artificial knee joint  Z96.652 CANCELED: XR Knee Bilateral Standard Extended VW      2. Status post right knee replacement  Z96.651       3. Left lumbar radiculopathy  M54.16 MRI LUMBAR SPINE WO CONTRAST     XR LUMBAR SPINE (2-3 VIEWS)          RECOMMENDATIONS:    Reviewed x-ray findings with the patient. This patient's clinical history and physical exam is consistent with radicular back pain. I discussed with her the natural history of this condition in that most episodes are typically self limited.   However, the symptoms can last for several months if not even longer. What I currently recommend is that she continues with conservative treatments to help cope with the symptoms and avoid having back surgery at this time. She understands that conservative treatments typically include activity modification, NSAIDs and physical therapy. Oral and/or epidural steroids could be considered in resistant scenarios. Also, she  may want to explore chiropractic care or acupuncture. I advised to avoid any prolonged bedrest and to try to maintain ADLs as much as possible. The patient was counseled to follow up me should she  develop any neurologic symptoms such as leg pain.      ----The patient will be treated observantly with self directed symptomatic care. Instructions were given to follow up if there is any neurologic or functional decline.  ---- A home exercise program was prescribed for stretching and strengthening. A list of exercises was provided.    ---- An MRI was ordered to delineate anatomy, confirm the diagnosis and assess the severity.  ---- Referral to Jhoan Goodson NP or Rhoda Khan PA-C for further evaluation of back pain      4--this is an undiagnosed new problem with uncertain prognosis

## 2022-12-16 ENCOUNTER — TELEMEDICINE (OUTPATIENT)
Dept: PRIMARY CARE CLINIC | Facility: CLINIC | Age: 82
End: 2022-12-16
Payer: MEDICARE

## 2022-12-16 DIAGNOSIS — N18.30 CKD STAGE 3 DUE TO TYPE 2 DIABETES MELLITUS (HCC): ICD-10-CM

## 2022-12-16 DIAGNOSIS — I10 PRIMARY HYPERTENSION: ICD-10-CM

## 2022-12-16 DIAGNOSIS — G89.29 OTHER CHRONIC PAIN: ICD-10-CM

## 2022-12-16 DIAGNOSIS — F41.9 ANXIETY: ICD-10-CM

## 2022-12-16 DIAGNOSIS — E11.22 CKD STAGE 3 DUE TO TYPE 2 DIABETES MELLITUS (HCC): ICD-10-CM

## 2022-12-16 DIAGNOSIS — E11.21 TYPE 2 DIABETES MELLITUS WITH NEPHROPATHY (HCC): Primary | ICD-10-CM

## 2022-12-16 PROCEDURE — 99442 PR PHYS/QHP TELEPHONE EVALUATION 11-20 MIN: CPT | Performed by: FAMILY MEDICINE

## 2022-12-16 RX ORDER — DIAZEPAM 5 MG/1
5 TABLET ORAL EVERY 12 HOURS PRN
Qty: 180 TABLET | Refills: 1 | Status: SHIPPED | OUTPATIENT
Start: 2022-12-16 | End: 2023-01-15

## 2022-12-16 RX ORDER — LANCETS 30 GAUGE
EACH MISCELLANEOUS
Qty: 100 EACH | Refills: 11 | Status: SHIPPED | OUTPATIENT
Start: 2022-12-16

## 2022-12-16 RX ORDER — MAGNESIUM OXIDE 400 MG/1
400 TABLET ORAL 2 TIMES DAILY
Qty: 60 TABLET | Refills: 5 | Status: SHIPPED | OUTPATIENT
Start: 2022-12-16

## 2022-12-16 RX ORDER — HYDROCHLOROTHIAZIDE 12.5 MG/1
12.5 CAPSULE, GELATIN COATED ORAL EVERY MORNING
Qty: 90 CAPSULE | Refills: 1 | Status: SHIPPED | OUTPATIENT
Start: 2022-12-16

## 2022-12-16 NOTE — PROGRESS NOTES
Crystal Petersen is a 78 y.o. female evaluated via telephone on 12/16/2022 for No chief complaint on file. .        Documentation:  I communicated with the patient and/or health care decision maker about health care and medication refills. Pt says is hurting all the time, event her \"rear end\" hurts, arthritis has gotten very severe. Pt says takes tylenol. Pt says has never been given anything. Tylenol is what she has been told. Pt says is going to try and make it for a while in the this home. Pt says needs to go in nursing home. Pt is on Medicare Medicaid. Details of this discussion including any medical advice provided: as noted. Pt is giving medicare refills:  Levemir insulin for diabetes management. One touch delica lancets. Magnesium for leg cramps. Hydrocholorthiazide for LE edema  Valium for general anxiety disorder, pt says helps some. Pt is advised RTC in 3 months recheck we can discuss again her situation and possible move to nursing home. Care Coordination through hospital will be advised of patients need for help whenever she is ready to ask for  this help. Pt declines use of opioids, and will continue to use tylenol for arthritis pain. Pt will contact physician for any other concerns. Pt reports her family is not interested in her so she will need to seek accommodations on her own. Total Time: minutes: 11-20 minutes    Crystal Petersen was evaluated through a synchronous (real-time) audio encounter. Patient identification was verified at the start of the visit. She (or guardian if applicable) is aware that this is a billable service, which includes applicable co-pays. This visit was conducted with the patient's (and/or legal guardian's) verbal consent. She has not had a related appointment within my department in the past 7 days or scheduled within the next 24 hours. The patient was located at Home: [place] [address].   The provider was located at [facility] ([address] [place] [address] Dept): [address],  [address]     Note: not billable if this call serves to triage the patient into an appointment for the relevant concern    Tasneem Ruiz MD

## 2022-12-17 DIAGNOSIS — E11.21 TYPE 2 DIABETES MELLITUS WITH NEPHROPATHY (HCC): ICD-10-CM

## 2022-12-22 ENCOUNTER — TELEPHONE (OUTPATIENT)
Dept: PRIMARY CARE CLINIC | Facility: CLINIC | Age: 82
End: 2022-12-22

## 2022-12-22 NOTE — TELEPHONE ENCOUNTER
Spoke with pharmacy - pt last filled it on 12/8 and for insurance to pay it would have to wait to be filled on 12/24 - Please call pt back

## 2022-12-22 NOTE — TELEPHONE ENCOUNTER
Kasie Simeon states that the pharmacy does not have the levemir.  She has someone to pick it up today and really needs it filled today

## 2022-12-24 DIAGNOSIS — E11.21 TYPE 2 DIABETES MELLITUS WITH NEPHROPATHY (HCC): ICD-10-CM

## 2023-01-03 ENCOUNTER — TELEPHONE (OUTPATIENT)
Dept: PRIMARY CARE CLINIC | Facility: CLINIC | Age: 83
End: 2023-01-03

## 2023-01-03 DIAGNOSIS — E11.21 TYPE 2 DIABETES MELLITUS WITH NEPHROPATHY (HCC): Primary | ICD-10-CM

## 2023-01-03 DIAGNOSIS — F51.01 PRIMARY INSOMNIA: ICD-10-CM

## 2023-01-03 RX ORDER — TEMAZEPAM 15 MG/1
CAPSULE ORAL
Qty: 30 CAPSULE | Refills: 2 | Status: SHIPPED | OUTPATIENT
Start: 2023-01-03 | End: 2023-02-03

## 2023-01-03 RX ORDER — PEN NEEDLE, DIABETIC 32GX 5/32"
NEEDLE, DISPOSABLE MISCELLANEOUS
COMMUNITY
Start: 2022-12-02 | End: 2023-01-03 | Stop reason: SDUPTHER

## 2023-01-03 RX ORDER — PEN NEEDLE, DIABETIC 32GX 5/32"
NEEDLE, DISPOSABLE MISCELLANEOUS
Qty: 100 EACH | Refills: 5 | Status: SHIPPED | OUTPATIENT
Start: 2023-01-03

## 2023-01-03 RX ORDER — TEMAZEPAM 15 MG/1
CAPSULE ORAL
COMMUNITY
Start: 2022-12-05 | End: 2023-01-03 | Stop reason: SDUPTHER

## 2023-01-06 RX ORDER — INSULIN DETEMIR 100 [IU]/ML
INJECTION, SOLUTION SUBCUTANEOUS
Refills: 5 | OUTPATIENT
Start: 2023-01-06

## 2023-02-21 RX ORDER — BLOOD SUGAR DIAGNOSTIC
STRIP MISCELLANEOUS
Qty: 300 STRIP | Refills: 3 | OUTPATIENT
Start: 2023-02-21

## 2023-02-21 RX ORDER — AMLODIPINE BESYLATE 2.5 MG/1
TABLET ORAL
Qty: 90 TABLET | Refills: 1 | OUTPATIENT
Start: 2023-02-21

## 2023-03-07 ENCOUNTER — TELEPHONE (OUTPATIENT)
Dept: PRIMARY CARE CLINIC | Facility: CLINIC | Age: 83
End: 2023-03-07

## 2023-03-07 DIAGNOSIS — E11.21 TYPE 2 DIABETES MELLITUS WITH NEPHROPATHY (HCC): ICD-10-CM

## 2023-03-07 RX ORDER — INSULIN GLARGINE 100 [IU]/ML
INJECTION, SOLUTION SUBCUTANEOUS
Qty: 5 ADJUSTABLE DOSE PRE-FILLED PEN SYRINGE | Refills: 3 | Status: SHIPPED | OUTPATIENT
Start: 2023-03-07

## 2023-03-07 NOTE — TELEPHONE ENCOUNTER
Patient states her pharmacy told her that her insulin is on backorder.   She would like a call back [phone]

## 2023-03-08 ENCOUNTER — TELEPHONE (OUTPATIENT)
Dept: PRIMARY CARE CLINIC | Facility: CLINIC | Age: 83
End: 2023-03-08

## 2023-03-08 DIAGNOSIS — E11.21 TYPE 2 DIABETES MELLITUS WITH NEPHROPATHY (HCC): ICD-10-CM

## 2023-03-08 RX ORDER — PEN NEEDLE, DIABETIC 32GX 5/32"
NEEDLE, DISPOSABLE MISCELLANEOUS
Qty: 100 EACH | Refills: 5 | Status: SHIPPED | OUTPATIENT
Start: 2023-03-08

## 2023-03-08 NOTE — TELEPHONE ENCOUNTER
Spoke to patient and pharmacy. They do have the lantus RX that was sent yesterday to replace levemir.  Please send in insulin needles for patient

## 2023-03-08 NOTE — TELEPHONE ENCOUNTER
Patient stated that she is having trouble getting her insulin and she only has one needle. And that the pharmacy is telling her that it is going to cost her $500.00. And she is running out of insulin and she needs help today.   Please call her at [phone]

## 2023-03-16 ENCOUNTER — TELEMEDICINE (OUTPATIENT)
Dept: PRIMARY CARE CLINIC | Facility: CLINIC | Age: 83
End: 2023-03-16
Payer: MEDICARE

## 2023-03-16 ENCOUNTER — TELEPHONE (OUTPATIENT)
Dept: PRIMARY CARE CLINIC | Facility: CLINIC | Age: 83
End: 2023-03-16

## 2023-03-16 DIAGNOSIS — L82.1 OTHER SEBORRHEIC KERATOSIS: ICD-10-CM

## 2023-03-16 DIAGNOSIS — I10 PRIMARY HYPERTENSION: ICD-10-CM

## 2023-03-16 DIAGNOSIS — M81.0 OSTEOPOROSIS, UNSPECIFIED OSTEOPOROSIS TYPE, UNSPECIFIED PATHOLOGICAL FRACTURE PRESENCE: ICD-10-CM

## 2023-03-16 DIAGNOSIS — E11.22 CKD STAGE 3 DUE TO TYPE 2 DIABETES MELLITUS (HCC): ICD-10-CM

## 2023-03-16 DIAGNOSIS — F51.01 PRIMARY INSOMNIA: ICD-10-CM

## 2023-03-16 DIAGNOSIS — L67.8 ABNORMAL FACIAL HAIR: ICD-10-CM

## 2023-03-16 DIAGNOSIS — E11.21 TYPE 2 DIABETES MELLITUS WITH NEPHROPATHY (HCC): Primary | ICD-10-CM

## 2023-03-16 DIAGNOSIS — N18.30 CKD STAGE 3 DUE TO TYPE 2 DIABETES MELLITUS (HCC): ICD-10-CM

## 2023-03-16 DIAGNOSIS — M19.90 ARTHRITIS: ICD-10-CM

## 2023-03-16 PROCEDURE — 99442 PR PHYS/QHP TELEPHONE EVALUATION 11-20 MIN: CPT | Performed by: FAMILY MEDICINE

## 2023-03-16 RX ORDER — INSULIN GLARGINE 100 [IU]/ML
INJECTION, SOLUTION SUBCUTANEOUS
Qty: 5 ADJUSTABLE DOSE PRE-FILLED PEN SYRINGE | Refills: 3
Start: 2023-03-16

## 2023-03-16 RX ORDER — TEMAZEPAM 15 MG/1
CAPSULE ORAL
Qty: 30 CAPSULE | Refills: 5 | Status: SHIPPED | OUTPATIENT
Start: 2023-03-16 | End: 2023-04-16

## 2023-03-16 RX ORDER — QUINAPRIL 40 MG/1
40 TABLET ORAL DAILY
Qty: 90 TABLET | Refills: 3 | Status: SHIPPED | OUTPATIENT
Start: 2023-03-16

## 2023-03-16 SDOH — ECONOMIC STABILITY: INCOME INSECURITY: HOW HARD IS IT FOR YOU TO PAY FOR THE VERY BASICS LIKE FOOD, HOUSING, MEDICAL CARE, AND HEATING?: NOT VERY HARD

## 2023-03-16 SDOH — ECONOMIC STABILITY: FOOD INSECURITY: WITHIN THE PAST 12 MONTHS, THE FOOD YOU BOUGHT JUST DIDN'T LAST AND YOU DIDN'T HAVE MONEY TO GET MORE.: NEVER TRUE

## 2023-03-16 SDOH — ECONOMIC STABILITY: FOOD INSECURITY: WITHIN THE PAST 12 MONTHS, YOU WORRIED THAT YOUR FOOD WOULD RUN OUT BEFORE YOU GOT MONEY TO BUY MORE.: NEVER TRUE

## 2023-03-16 SDOH — ECONOMIC STABILITY: HOUSING INSECURITY
IN THE LAST 12 MONTHS, WAS THERE A TIME WHEN YOU DID NOT HAVE A STEADY PLACE TO SLEEP OR SLEPT IN A SHELTER (INCLUDING NOW)?: NO

## 2023-03-16 ASSESSMENT — PATIENT HEALTH QUESTIONNAIRE - PHQ9
SUM OF ALL RESPONSES TO PHQ QUESTIONS 1-9: 0
1. LITTLE INTEREST OR PLEASURE IN DOING THINGS: 0
SUM OF ALL RESPONSES TO PHQ9 QUESTIONS 1 & 2: 0
2. FEELING DOWN, DEPRESSED OR HOPELESS: 0
SUM OF ALL RESPONSES TO PHQ QUESTIONS 1-9: 0

## 2023-03-16 NOTE — PROGRESS NOTES
Los Sorenson is a 78 y.o. female evaluated via telephone on 3/16/2023 for No chief complaint on file. .        Documentation:  I communicated with the patient and/or health care decision maker about medication refills. Pt reports low sugar 72, over night, became shaky. Pt is worried about new insulin lantus. Pt is advised to continue same dose of lantus in am but reduce the evening dose to 20 from 36 units. Pt is also sent diabetic supplies. Pt has been growing more hair , unde chin and other parts of face, facial hair. Pt wants help with this problem. Pt is referred to endocrinology for evaluation and treatment. Pt is having problems on right leg from knee to feet. Pt reports skin changes. Pt is advised to have this reviewed by her dermatologist for best results, as uncertain etiology, not able to see over phone. Pt is having seborrheic keratosis on body and face, pt says is worsening. and needs to be seen for evaluation and treatment. Pt is referred to [facility]. Pt says she needs more than freezing of lesions, as they keep coming back. Pt is concerned about needing to possibly go to a nursing home, reports is becoming more feeble. Pt is advised when she is ready to go, we will place a call to Care Coordination, who will be able to help her with this decision. Details of this discussion including any medical advice provided: as noted    Total Time: minutes: 11-20 minutes    Los Sorenson was evaluated through a synchronous (real-time) audio encounter. Patient identification was verified at the start of the visit. She (or guardian if applicable) is aware that this is a billable service, which includes applicable co-pays. This visit was conducted with the patient's (and/or legal guardian's) verbal consent. She has not had a related appointment within my department in the past 7 days or scheduled within the next 24 hours.    The patient was located at Home: [facility] [address] [place] [address]. The provider was located at [place] [address] ([address] [place], [place] [address]t): [facility],  [address]     Note: not billable if this call serves to triage the patient into an appointment for the relevant concern    Kee Asher MD

## 2023-03-20 DIAGNOSIS — E11.21 TYPE 2 DIABETES MELLITUS WITH NEPHROPATHY (HCC): ICD-10-CM

## 2023-04-19 ENCOUNTER — TELEPHONE (OUTPATIENT)
Dept: PRIMARY CARE CLINIC | Facility: CLINIC | Age: 83
End: 2023-04-19

## 2023-04-19 NOTE — TELEPHONE ENCOUNTER
Patient called c/o weakness and fatigue. Requesting an appt Monday so a family member could provide transportation. No available appts. Per Dr Fuad Price, schedule patient for nurse visit Monday to get labs and vitals. Will schedule a VV to go over results and discuss treatment.

## 2023-04-20 ENCOUNTER — TELEPHONE (OUTPATIENT)
Dept: PRIMARY CARE CLINIC | Facility: CLINIC | Age: 83
End: 2023-04-20

## 2023-04-24 ENCOUNTER — OFFICE VISIT (OUTPATIENT)
Dept: PRIMARY CARE CLINIC | Facility: CLINIC | Age: 83
End: 2023-04-24
Payer: MEDICARE

## 2023-04-24 VITALS
OXYGEN SATURATION: 95 % | SYSTOLIC BLOOD PRESSURE: 113 MMHG | BODY MASS INDEX: 31.89 KG/M2 | TEMPERATURE: 98 F | DIASTOLIC BLOOD PRESSURE: 60 MMHG | HEIGHT: 63 IN | WEIGHT: 180 LBS | HEART RATE: 118 BPM

## 2023-04-24 DIAGNOSIS — E11.21 TYPE 2 DIABETES MELLITUS WITH NEPHROPATHY (HCC): ICD-10-CM

## 2023-04-24 DIAGNOSIS — Z78.9 POOR TOLERANCE FOR AMBULATION: Primary | ICD-10-CM

## 2023-04-24 DIAGNOSIS — R41.3 INTERMITTENT MEMORY LOSS: ICD-10-CM

## 2023-04-24 DIAGNOSIS — Z13.228 SCREENING FOR PHENYLKETONURIA (PKU): ICD-10-CM

## 2023-04-24 DIAGNOSIS — M19.90 ARTHRITIS: ICD-10-CM

## 2023-04-24 DIAGNOSIS — E11.22 CKD STAGE 3 DUE TO TYPE 2 DIABETES MELLITUS (HCC): ICD-10-CM

## 2023-04-24 DIAGNOSIS — N18.30 CKD STAGE 3 DUE TO TYPE 2 DIABETES MELLITUS (HCC): ICD-10-CM

## 2023-04-24 DIAGNOSIS — G89.29 OTHER CHRONIC PAIN: ICD-10-CM

## 2023-04-24 DIAGNOSIS — F41.9 ANXIETY: ICD-10-CM

## 2023-04-24 DIAGNOSIS — Z13.1 SCREENING FOR DIABETES MELLITUS: ICD-10-CM

## 2023-04-24 DIAGNOSIS — R53.82 CHRONIC FATIGUE: ICD-10-CM

## 2023-04-24 DIAGNOSIS — M81.0 OSTEOPOROSIS, UNSPECIFIED OSTEOPOROSIS TYPE, UNSPECIFIED PATHOLOGICAL FRACTURE PRESENCE: ICD-10-CM

## 2023-04-24 DIAGNOSIS — Z13.6 SCREENING FOR ISCHEMIC HEART DISEASE: ICD-10-CM

## 2023-04-24 DIAGNOSIS — M17.12 PRIMARY OSTEOARTHRITIS OF LEFT KNEE: ICD-10-CM

## 2023-04-24 PROCEDURE — 3074F SYST BP LT 130 MM HG: CPT | Performed by: FAMILY MEDICINE

## 2023-04-24 PROCEDURE — 1123F ACP DISCUSS/DSCN MKR DOCD: CPT | Performed by: FAMILY MEDICINE

## 2023-04-24 PROCEDURE — 99214 OFFICE O/P EST MOD 30 MIN: CPT | Performed by: FAMILY MEDICINE

## 2023-04-24 PROCEDURE — 3078F DIAST BP <80 MM HG: CPT | Performed by: FAMILY MEDICINE

## 2023-04-24 SDOH — ECONOMIC STABILITY: FOOD INSECURITY: WITHIN THE PAST 12 MONTHS, YOU WORRIED THAT YOUR FOOD WOULD RUN OUT BEFORE YOU GOT MONEY TO BUY MORE.: NEVER TRUE

## 2023-04-24 SDOH — ECONOMIC STABILITY: FOOD INSECURITY: WITHIN THE PAST 12 MONTHS, THE FOOD YOU BOUGHT JUST DIDN'T LAST AND YOU DIDN'T HAVE MONEY TO GET MORE.: NEVER TRUE

## 2023-04-24 SDOH — ECONOMIC STABILITY: INCOME INSECURITY: HOW HARD IS IT FOR YOU TO PAY FOR THE VERY BASICS LIKE FOOD, HOUSING, MEDICAL CARE, AND HEATING?: NOT HARD AT ALL

## 2023-04-24 ASSESSMENT — PATIENT HEALTH QUESTIONNAIRE - PHQ9
SUM OF ALL RESPONSES TO PHQ QUESTIONS 1-9: 0
1. LITTLE INTEREST OR PLEASURE IN DOING THINGS: 0
2. FEELING DOWN, DEPRESSED OR HOPELESS: 0
SUM OF ALL RESPONSES TO PHQ QUESTIONS 1-9: 0
SUM OF ALL RESPONSES TO PHQ9 QUESTIONS 1 & 2: 0

## 2023-04-24 NOTE — PROGRESS NOTES
breath sounds, but otherwise mostly clear to ascultation bilaterally  Abdominal:      General: Bowel sounds are normal.      Palpations: Abdomen is soft. Comments: Obese, BMI 31.89, weight 180lbs   Musculoskeletal:      Cervical back: Normal range of motion and neck supple. Comments: Pt is wearing braces (wraps velcro type) to support both legs so can ambulate with walker, due to knee weakness. Skin:     General: Skin is warm and dry. Capillary Refill: Capillary refill takes 2 to 3 seconds. Neurological:      General: No focal deficit present. Mental Status: She is alert and oriented to person, place, and time. Psychiatric:         Behavior: Behavior normal.         Thought Content: Thought content normal.         Judgment: Judgment normal.      Comments: Acute health anxiety           LABS  No results found for this visit on 04/24/23. IMPRESSION/PLAN     Diagnosis Orders   1. Type 2 diabetes mellitus with nephropathy ([facility]        2. CKD stage 3 due to type 2 diabetes mellitus ([facility]        3. Primary osteoarthritis of left knee        4. Osteoporosis, unspecified osteoporosis type, unspecified pathological fracture presence        5. Arthritis        6. Other chronic pain        7. Anxiety        8. Intermittent memory loss            No follow-up provider specified. Ama Mtz MD            Dictated using voice recognition software.  Proofread, but unrecognized voice recognition errors may exist.

## 2023-04-25 ENCOUNTER — CARE COORDINATION (OUTPATIENT)
Dept: CARE COORDINATION | Facility: CLINIC | Age: 83
End: 2023-04-25

## 2023-04-25 LAB
ALBUMIN SERPL-MCNC: 3.9 G/DL (ref 3.2–4.6)
ALBUMIN/GLOB SERPL: 1 (ref 0.4–1.6)
ALP SERPL-CCNC: 53 U/L (ref 50–136)
ALT SERPL-CCNC: 28 U/L (ref 12–65)
ANION GAP SERPL CALC-SCNC: 6 MMOL/L (ref 2–11)
AST SERPL-CCNC: 16 U/L (ref 15–37)
BILIRUB SERPL-MCNC: 0.4 MG/DL (ref 0.2–1.1)
BUN SERPL-MCNC: 19 MG/DL (ref 8–23)
CALCIUM SERPL-MCNC: 11.3 MG/DL (ref 8.3–10.4)
CHLORIDE SERPL-SCNC: 108 MMOL/L (ref 101–110)
CHOLEST SERPL-MCNC: 319 MG/DL
CO2 SERPL-SCNC: 24 MMOL/L (ref 21–32)
CREAT SERPL-MCNC: 1.2 MG/DL (ref 0.6–1)
GLOBULIN SER CALC-MCNC: 3.8 G/DL (ref 2.8–4.5)
GLUCOSE SERPL-MCNC: 159 MG/DL (ref 65–100)
HDLC SERPL-MCNC: 58 MG/DL (ref 40–60)
HDLC SERPL: 5.5
LDLC SERPL CALC-MCNC: 208 MG/DL
POTASSIUM SERPL-SCNC: 4.3 MMOL/L (ref 3.5–5.1)
PROT SERPL-MCNC: 7.7 G/DL (ref 6.3–8.2)
SODIUM SERPL-SCNC: 138 MMOL/L (ref 133–143)
T4 FREE SERPL-MCNC: 1.1 NG/DL (ref 0.78–1.46)
TRIGL SERPL-MCNC: 265 MG/DL (ref 35–150)
TSH, 3RD GENERATION: 2.24 UIU/ML (ref 0.36–3.74)
VLDLC SERPL CALC-MCNC: 53 MG/DL (ref 6–23)

## 2023-04-26 LAB
EST. AVERAGE GLUCOSE BLD GHB EST-MCNC: 200 MG/DL
HBA1C MFR BLD: 8.6 % (ref 4.8–5.6)

## 2023-04-28 ENCOUNTER — CARE COORDINATION (OUTPATIENT)
Dept: CARE COORDINATION | Facility: CLINIC | Age: 83
End: 2023-04-28

## 2023-05-04 ENCOUNTER — CARE COORDINATION (OUTPATIENT)
Dept: CARE COORDINATION | Facility: CLINIC | Age: 83
End: 2023-05-04

## 2023-05-08 ENCOUNTER — CARE COORDINATION (OUTPATIENT)
Dept: CARE COORDINATION | Facility: CLINIC | Age: 83
End: 2023-05-08

## 2023-05-08 ENCOUNTER — TELEPHONE (OUTPATIENT)
Dept: PRIMARY CARE CLINIC | Facility: CLINIC | Age: 83
End: 2023-05-08

## 2023-05-08 DIAGNOSIS — R41.3 INTERMITTENT MEMORY LOSS: ICD-10-CM

## 2023-05-08 DIAGNOSIS — E11.21 TYPE 2 DIABETES MELLITUS WITH NEPHROPATHY (HCC): Primary | ICD-10-CM

## 2023-05-08 RX ORDER — BENAZEPRIL HYDROCHLORIDE 40 MG/1
40 TABLET, FILM COATED ORAL DAILY
Qty: 90 TABLET | Refills: 3 | Status: SHIPPED | OUTPATIENT
Start: 2023-05-08

## 2023-05-08 NOTE — TELEPHONE ENCOUNTER
Pt requests in home assessment, pt wants referral to NH and MSW requests in home assessment by [facility] services.

## 2023-05-09 ENCOUNTER — HOME HEALTH ADMISSION (OUTPATIENT)
Dept: HOME HEALTH SERVICES | Facility: HOME HEALTH | Age: 83
End: 2023-05-09

## 2023-05-09 ENCOUNTER — TELEPHONE (OUTPATIENT)
Dept: PRIMARY CARE CLINIC | Facility: CLINIC | Age: 83
End: 2023-05-09

## 2023-05-09 DIAGNOSIS — Z74.2: ICD-10-CM

## 2023-05-09 DIAGNOSIS — Z78.9 POOR TOLERANCE FOR AMBULATION: Primary | ICD-10-CM

## 2023-05-09 NOTE — TELEPHONE ENCOUNTER
Referral is needed to [facility] to assess safety of patient in home and to qualify her for Nursing Home. I will redo with poor ambulation as diagnosis, as pt needs to have in home assessment for safety and to obtain a way with SW helping her to get into a nursing home facility. This is why pt came to office. Pt is a diabetic and this is part of her reason for needing long term care. Pt says family will not accept her into their homes in [place].

## 2023-05-09 NOTE — TELEPHONE ENCOUNTER
[facility] [address] can not take referral due to not being able to use face to face because diabetes was not listed in notes.     Please call back at [phone] with any questions

## 2023-05-10 ENCOUNTER — CARE COORDINATION (OUTPATIENT)
Dept: CARE COORDINATION | Facility: CLINIC | Age: 83
End: 2023-05-10

## 2023-05-10 ENCOUNTER — HOME HEALTH ADMISSION (OUTPATIENT)
Dept: HOME HEALTH SERVICES | Facility: HOME HEALTH | Age: 83
End: 2023-05-10

## 2023-05-10 NOTE — CARE COORDINATION
LANG MALONE received call from [facility] regarding recent referral.  There was some confusion about if referral was for eval only or continued services. LANG MALONE clarified it is not for ARIELLE of SNF. Pt interested in remaining in her home at this time. LANG MALONE confirmed this with pt-she has Medicaid and can get CLTC if she decides to in the future. LANG MALONE messaged PCP regarding confusion and explained. [facility] can admit pt if indicated.

## 2023-05-11 ENCOUNTER — CARE COORDINATION (OUTPATIENT)
Dept: CARE COORDINATION | Facility: CLINIC | Age: 83
End: 2023-05-11

## 2023-05-11 NOTE — CARE COORDINATION
LANG MALONE received message from PCP stating that he will hold off on any orders until he meets with pt at their next visit. 5.17.23 At that time, PCP will determine what referrals are most appropriate.

## 2023-05-15 ENCOUNTER — TELEPHONE (OUTPATIENT)
Dept: PRIMARY CARE CLINIC | Facility: CLINIC | Age: 83
End: 2023-05-15

## 2023-05-15 NOTE — TELEPHONE ENCOUNTER
Donny from [address] [place] [address] requesting a call back [facility] - [phone]

## 2023-05-17 ENCOUNTER — TELEMEDICINE (OUTPATIENT)
Dept: PRIMARY CARE CLINIC | Facility: CLINIC | Age: 83
End: 2023-05-17
Payer: MEDICARE

## 2023-05-17 DIAGNOSIS — E11.22 CKD STAGE 3 DUE TO TYPE 2 DIABETES MELLITUS (HCC): Primary | ICD-10-CM

## 2023-05-17 DIAGNOSIS — N18.30 CKD STAGE 3 DUE TO TYPE 2 DIABETES MELLITUS (HCC): Primary | ICD-10-CM

## 2023-05-17 DIAGNOSIS — E11.21 TYPE 2 DIABETES MELLITUS WITH NEPHROPATHY (HCC): ICD-10-CM

## 2023-05-17 PROCEDURE — 99442 PR PHYS/QHP TELEPHONE EVALUATION 11-20 MIN: CPT | Performed by: FAMILY MEDICINE

## 2023-05-17 ASSESSMENT — PATIENT HEALTH QUESTIONNAIRE - PHQ9
SUM OF ALL RESPONSES TO PHQ QUESTIONS 1-9: 0
1. LITTLE INTEREST OR PLEASURE IN DOING THINGS: 0
2. FEELING DOWN, DEPRESSED OR HOPELESS: 0
SUM OF ALL RESPONSES TO PHQ9 QUESTIONS 1 & 2: 0
SUM OF ALL RESPONSES TO PHQ QUESTIONS 1-9: 0

## 2023-05-17 NOTE — PROGRESS NOTES
Jorge Alberto Cruz is a 78 y.o. female evaluated via telephone on 5/17/2023 for No chief complaint on file. .        Documentation:  I communicated with the patient and/or health care decision maker about how to get the HGBa1c, down, pt wants better diabetes control. Hgba1c 8.6%, average glucose 200. Pt says has had diabetes. Pt needs to be 180 or less all the time for a 3 month average. Pt reports she is only receiving 5 insulin pens a month, and one pen last only 4 days. Pt is advised physician calculates she needs from 7-8 pens per month, as we are going up on her insulin to 40 units am and 40 units pm to bring her better glucose control. Goal is 8% hgba1c or lower. Pt agrees to same. Pt will contact back or sister who picks up her insulin if needed, or if any questions. RTC at next scheduled appt. If not appt, make one in 3 months. Details of this discussion including any medical advice provided: as noted. Total Time: minutes: 11-20 minutes    Jorge Alberto Cruz was evaluated through a synchronous (real-time) audio encounter. Patient identification was verified at the start of the visit. She (or guardian if applicable) is aware that this is a billable service, which includes applicable co-pays. This visit was conducted with the patient's (and/or legal guardian's) verbal consent. She has not had a related appointment within my department in the past 7 days or scheduled within the next 24 hours. The patient was located at Home: [place] [address]. The provider was located at [facility] ([address] [place] [address]): [place] [address]..     Note: not billable if this call serves to triage the patient into an appointment for the relevant concern    Benitez Aaron MD

## 2023-05-19 ENCOUNTER — CARE COORDINATION (OUTPATIENT)
Dept: CARE COORDINATION | Facility: CLINIC | Age: 83
End: 2023-05-19

## 2023-05-19 NOTE — CARE COORDINATION
Follow up call made to pt. Left VM requesting call back. LANG CM will check back in with pt next week.

## 2023-05-22 DIAGNOSIS — E11.21 TYPE 2 DIABETES MELLITUS WITH NEPHROPATHY (HCC): Primary | ICD-10-CM

## 2023-05-22 RX ORDER — BLOOD-GLUCOSE METER
EACH MISCELLANEOUS
Qty: 1 KIT | Refills: 0 | Status: SHIPPED | OUTPATIENT
Start: 2023-05-22

## 2023-05-22 RX ORDER — BLOOD-GLUCOSE METER
EACH MISCELLANEOUS
COMMUNITY
End: 2023-05-22 | Stop reason: SDUPTHER

## 2023-05-24 ENCOUNTER — CARE COORDINATION (OUTPATIENT)
Dept: CARE COORDINATION | Facility: CLINIC | Age: 83
End: 2023-05-24

## 2023-05-24 NOTE — CARE COORDINATION
Final follow up call made.   If no call back is received within 5 business days, SW CM will be removed from care team.

## 2023-05-26 ENCOUNTER — CARE COORDINATION (OUTPATIENT)
Dept: CARE COORDINATION | Facility: CLINIC | Age: 83
End: 2023-05-26

## 2023-05-30 ENCOUNTER — TELEPHONE (OUTPATIENT)
Dept: PRIMARY CARE CLINIC | Facility: CLINIC | Age: 83
End: 2023-05-30

## 2023-06-22 ENCOUNTER — CARE COORDINATION (OUTPATIENT)
Dept: CARE COORDINATION | Facility: CLINIC | Age: 83
End: 2023-06-22

## 2023-06-22 NOTE — CARE COORDINATION
ACM noted visit to Ksenia w/ dc to SNF, attempted to reach 2 contact numbers and emergency contact (number invalid and removed).

## 2023-06-22 NOTE — CARE COORDINATION
ACM received call returned [facility] who states pt remains at [address], pt to be there for 3-4wks, unsure if pt will be returning home. She will retain ACM contact information in event pt comes home. ACM will continue to follow for dc plans.

## 2023-06-28 ENCOUNTER — TELEPHONE (OUTPATIENT)
Dept: PRIMARY CARE CLINIC | Facility: CLINIC | Age: 83
End: 2023-06-28

## 2023-07-10 ENCOUNTER — TELEPHONE (OUTPATIENT)
Dept: PRIMARY CARE CLINIC | Facility: CLINIC | Age: 83
End: 2023-07-10

## 2023-07-10 DIAGNOSIS — I10 PRIMARY HYPERTENSION: ICD-10-CM

## 2023-07-11 ENCOUNTER — TELEPHONE (OUTPATIENT)
Dept: PRIMARY CARE CLINIC | Facility: CLINIC | Age: 83
End: 2023-07-11

## 2023-07-11 DIAGNOSIS — E11.21 TYPE 2 DIABETES MELLITUS WITH NEPHROPATHY (HCC): Primary | ICD-10-CM

## 2023-07-11 DIAGNOSIS — E11.21 TYPE 2 DIABETES MELLITUS WITH NEPHROPATHY (HCC): ICD-10-CM

## 2023-07-11 RX ORDER — PEN NEEDLE, DIABETIC 32GX 5/32"
NEEDLE, DISPOSABLE MISCELLANEOUS
Qty: 100 EACH | Refills: 5 | Status: SHIPPED | OUTPATIENT
Start: 2023-07-11

## 2023-07-17 ENCOUNTER — TELEPHONE (OUTPATIENT)
Dept: PRIMARY CARE CLINIC | Facility: CLINIC | Age: 83
End: 2023-07-17

## 2023-07-17 NOTE — TELEPHONE ENCOUNTER
Patient transferred from [facility] ([facility]) and she stated that weak and can hardly get around, and her blood sugars are up and down. She only has transportation from 8-11. She said blood sugars were elevated this morning 221 and that she really needs to be seen. Please Advise.  She can be reached at [phone]

## 2023-07-18 RX ORDER — HYDROCHLOROTHIAZIDE 12.5 MG/1
12.5 CAPSULE, GELATIN COATED ORAL EVERY MORNING
Qty: 90 CAPSULE | Refills: 1 | OUTPATIENT
Start: 2023-07-18

## 2023-07-19 ENCOUNTER — CARE COORDINATION (OUTPATIENT)
Dept: CARE COORDINATION | Facility: CLINIC | Age: 83
End: 2023-07-19

## 2023-07-19 ENCOUNTER — OFFICE VISIT (OUTPATIENT)
Dept: PRIMARY CARE CLINIC | Facility: CLINIC | Age: 83
End: 2023-07-19
Payer: MEDICARE

## 2023-07-19 VITALS
SYSTOLIC BLOOD PRESSURE: 108 MMHG | OXYGEN SATURATION: 96 % | WEIGHT: 180 LBS | HEART RATE: 92 BPM | DIASTOLIC BLOOD PRESSURE: 56 MMHG | BODY MASS INDEX: 31.89 KG/M2 | TEMPERATURE: 97.2 F

## 2023-07-19 DIAGNOSIS — F51.01 PRIMARY INSOMNIA: ICD-10-CM

## 2023-07-19 DIAGNOSIS — K27.9 PUD (PEPTIC ULCER DISEASE): ICD-10-CM

## 2023-07-19 DIAGNOSIS — R53.1 WEAKNESS GENERALIZED: ICD-10-CM

## 2023-07-19 DIAGNOSIS — F41.9 ANXIETY: ICD-10-CM

## 2023-07-19 DIAGNOSIS — Z60.2 LIVES ALONE WITHOUT HELP AVAILABLE: ICD-10-CM

## 2023-07-19 DIAGNOSIS — R39.81 URINARY INCONTINENCE DUE TO COGNITIVE IMPAIRMENT: ICD-10-CM

## 2023-07-19 DIAGNOSIS — Z89.412 STATUS POST AMPUTATION OF LEFT GREAT TOE (HCC): ICD-10-CM

## 2023-07-19 DIAGNOSIS — R41.3 INTERMITTENT MEMORY LOSS: ICD-10-CM

## 2023-07-19 DIAGNOSIS — M81.0 OSTEOPOROSIS, UNSPECIFIED OSTEOPOROSIS TYPE, UNSPECIFIED PATHOLOGICAL FRACTURE PRESENCE: ICD-10-CM

## 2023-07-19 DIAGNOSIS — Z96.659 STATUS POST TOTAL KNEE REPLACEMENT USING CEMENT, UNSPECIFIED LATERALITY: ICD-10-CM

## 2023-07-19 DIAGNOSIS — Z00.00 MEDICARE ANNUAL WELLNESS VISIT, SUBSEQUENT: Primary | ICD-10-CM

## 2023-07-19 DIAGNOSIS — N18.30 CKD STAGE 3 DUE TO TYPE 2 DIABETES MELLITUS (HCC): ICD-10-CM

## 2023-07-19 DIAGNOSIS — M17.12 PRIMARY OSTEOARTHRITIS OF LEFT KNEE: ICD-10-CM

## 2023-07-19 DIAGNOSIS — I10 PRIMARY HYPERTENSION: ICD-10-CM

## 2023-07-19 DIAGNOSIS — E11.21 TYPE 2 DIABETES MELLITUS WITH NEPHROPATHY (HCC): ICD-10-CM

## 2023-07-19 DIAGNOSIS — G89.29 OTHER CHRONIC PAIN: ICD-10-CM

## 2023-07-19 DIAGNOSIS — E11.22 CKD STAGE 3 DUE TO TYPE 2 DIABETES MELLITUS (HCC): ICD-10-CM

## 2023-07-19 PROCEDURE — 99214 OFFICE O/P EST MOD 30 MIN: CPT | Performed by: FAMILY MEDICINE

## 2023-07-19 PROCEDURE — G0439 PPPS, SUBSEQ VISIT: HCPCS | Performed by: FAMILY MEDICINE

## 2023-07-19 PROCEDURE — 1123F ACP DISCUSS/DSCN MKR DOCD: CPT | Performed by: FAMILY MEDICINE

## 2023-07-19 PROCEDURE — 3074F SYST BP LT 130 MM HG: CPT | Performed by: FAMILY MEDICINE

## 2023-07-19 PROCEDURE — 3052F HG A1C>EQUAL 8.0%<EQUAL 9.0%: CPT | Performed by: FAMILY MEDICINE

## 2023-07-19 PROCEDURE — 3078F DIAST BP <80 MM HG: CPT | Performed by: FAMILY MEDICINE

## 2023-07-19 SDOH — SOCIAL STABILITY - SOCIAL INSECURITY: PROBLEMS RELATED TO LIVING ALONE: Z60.2

## 2023-07-19 ASSESSMENT — PATIENT HEALTH QUESTIONNAIRE - PHQ9
SUM OF ALL RESPONSES TO PHQ QUESTIONS 1-9: 2
SUM OF ALL RESPONSES TO PHQ QUESTIONS 1-9: 2
SUM OF ALL RESPONSES TO PHQ9 QUESTIONS 1 & 2: 2
2. FEELING DOWN, DEPRESSED OR HOPELESS: 1
SUM OF ALL RESPONSES TO PHQ QUESTIONS 1-9: 2
SUM OF ALL RESPONSES TO PHQ QUESTIONS 1-9: 2
1. LITTLE INTEREST OR PLEASURE IN DOING THINGS: 1

## 2023-07-19 NOTE — PROGRESS NOTES
Mary Kay Molina MD  [address] Edinson Arredondo, [address] [facility]  Ph No:  [phone]  Fax:  [phone]:  Chief Complaint   Patient presents with    Follow-up    Follow-Up from Hospital     Pt in for hospital follow up for weakness and dehydration. Pt went to rehab and came home she is still weak and not eating. Pt has someone to come out for 4 hours and is alone the rest of the time. Pt was brought in by her sister-in-law. Labs Only     Pt wants labs drawn today. Medicare AWV     Pt in for hospital follow up.l    Discuss Medications     Pt needs to go over her medicine. HISTORY OF PRESENT ILLNESS:  Ms. Oksana Noe is a 78 y.o. female. Pt was in hospital, from [facility] 6-9, and is in rehab in [facility], and in there about two weeks. Pt was given insulin in rehab and was eating better in rehab. Pt reports staff kept her glucose under good control with regular use of insulin. .  Pt says when ran up, they managed with insulin. Pt has insulin at home to use, but reports at home she is not eating well, eats only small intermittent meals, says she \"is not hungry and has to force herself to eat. \"  Pt also says she is chronically tired and fatigued, \"can hardly go at all\" and has limited help at home. Pt says on weekend she is entirely alone for two days, Saturday and Sunday. Pt also reports a problem with forgetfulness. Pt and friend with her report she has one person coming  5 days a week for 4 hours, and she cooks and has need to mashed up her food so she can swallow, but she is  not eating, does not have appetite. Pt says she is mostly alone. Pt says she needs refills on  glucose test strips which are sent to pharmacy. Pt says she is out. Pt reports she is having weakness and she and her friend say she needs to be placed in NH. No one to care for her. Pt has no immediate family who can help her.     Pt is worried about diabetes and not eating, so advised

## 2023-07-19 NOTE — CARE COORDINATION
Spoke with the patient's niece who states that the patient would like long-term care nursing home. The patient was in rehab at [facility] but discharged. The patient's niece states she understands that she would need to find a nursing home that accepts medicaid for long-term care. LANG-KIRA will make referral for medicaid nursing home.

## 2023-07-20 PROBLEM — Z60.2 LIVES ALONE WITHOUT HELP AVAILABLE: Status: ACTIVE | Noted: 2023-07-20

## 2023-07-24 ENCOUNTER — CARE COORDINATION (OUTPATIENT)
Dept: CARE COORDINATION | Facility: CLINIC | Age: 83
End: 2023-07-24

## 2023-07-24 NOTE — CARE COORDINATION
LANG MALONE reached out to facility liaisons regarding Medicaid bed availability:    [facility] and [place] [place], [place] and [facility] have beds. Family will be contacted to determine preference.

## 2023-07-25 ENCOUNTER — CARE COORDINATION (OUTPATIENT)
Dept: CARE COORDINATION | Facility: CLINIC | Age: 83
End: 2023-07-25

## 2023-07-25 NOTE — CARE COORDINATION
Referral sent to [facility]. [address] [place] [address] asked if pt could do rehab first.  Not sure that there is an acute need-Melissa will read over most recent progress note to determine.

## 2023-07-26 ENCOUNTER — TELEPHONE (OUTPATIENT)
Dept: PRIMARY CARE CLINIC | Facility: CLINIC | Age: 83
End: 2023-07-26

## 2023-07-26 ENCOUNTER — CARE COORDINATION (OUTPATIENT)
Dept: CARE COORDINATION | Facility: CLINIC | Age: 83
End: 2023-07-26

## 2023-07-26 DIAGNOSIS — E11.21 TYPE 2 DIABETES MELLITUS WITH NEPHROPATHY (HCC): ICD-10-CM

## 2023-07-26 DIAGNOSIS — I10 PRIMARY HYPERTENSION: ICD-10-CM

## 2023-07-26 NOTE — CARE COORDINATION
[facility] inquiring if pt is getting HH. If so, needs notes. May admit for another round of STR before transitioning to LTC Medicaid bed if insurance approves.  LANG MALONE will follow up re: [address] [facility],[address]

## 2023-07-27 ENCOUNTER — CARE COORDINATION (OUTPATIENT)
Dept: CARE COORDINATION | Facility: CLINIC | Age: 83
End: 2023-07-27

## 2023-07-27 RX ORDER — AMLODIPINE BESYLATE 2.5 MG/1
TABLET ORAL
Qty: 90 TABLET | Refills: 1 | OUTPATIENT
Start: 2023-07-27

## 2023-07-27 RX ORDER — GLIPIZIDE 10 MG/1
TABLET ORAL
Qty: 90 TABLET | Refills: 3 | OUTPATIENT
Start: 2023-07-27

## 2023-07-27 NOTE — CARE COORDINATION
[facility] with Shimon Nicholson notified to look for [facility] notes from [employer].

## 2023-07-27 NOTE — CARE COORDINATION
PURA made call to [employer] and asked that they fax PT notes to [facility] nursing facility for review. They agreed. Patient's niece Srini Brown is aware. PURA called her as well with update.

## 2023-07-28 ENCOUNTER — TELEPHONE (OUTPATIENT)
Dept: PRIMARY CARE CLINIC | Facility: CLINIC | Age: 83
End: 2023-07-28

## 2023-07-31 ENCOUNTER — CARE COORDINATION (OUTPATIENT)
Dept: CARE COORDINATION | Facility: CLINIC | Age: 83
End: 2023-07-31

## 2023-07-31 NOTE — CARE COORDINATION
Spoke with the patient's niece and shared update about [place]. Antwon Sorenson is expected to try and get patient approve for possible Nursing Rehab first then long-term nursing care. PURA is still following.

## 2023-07-31 NOTE — CARE COORDINATION
Reached out to Tammy at [facility] to see if [facility] sent her therapy notes. Awaiting response.

## 2023-08-01 ENCOUNTER — TELEPHONE (OUTPATIENT)
Dept: PRIMARY CARE CLINIC | Facility: CLINIC | Age: 83
End: 2023-08-01

## 2023-08-01 DIAGNOSIS — E11.21 TYPE 2 DIABETES MELLITUS WITH NEPHROPATHY (HCC): ICD-10-CM

## 2023-08-01 DIAGNOSIS — E11.22 CKD STAGE 3 DUE TO TYPE 2 DIABETES MELLITUS (HCC): ICD-10-CM

## 2023-08-01 DIAGNOSIS — N18.30 CKD STAGE 3 DUE TO TYPE 2 DIABETES MELLITUS (HCC): ICD-10-CM

## 2023-08-01 NOTE — PROGRESS NOTES
Pt is reduced to 20 units in am and 20 units in pm, as pt insists the 40 units is too much insulin. Pt has been poorly controlled as to her diabetes. Pt is to keep records of her fasting glucose in am so we can adjust again later as indicated.

## 2023-08-02 ENCOUNTER — CARE COORDINATION (OUTPATIENT)
Dept: CARE COORDINATION | Facility: CLINIC | Age: 83
End: 2023-08-02

## 2023-08-02 NOTE — CARE COORDINATION
Called [facility] and requested that they send last OT and PT notes to [facility] for referral. PURA was told to call back after 8/2/23 after  OT and PT goes back out to see patient for updated notes.

## 2023-08-07 ENCOUNTER — CARE COORDINATION (OUTPATIENT)
Dept: CARE COORDINATION | Facility: CLINIC | Age: 83
End: 2023-08-07

## 2023-08-08 ENCOUNTER — CARE COORDINATION (OUTPATIENT)
Dept: CARE COORDINATION | Facility: CLINIC | Age: 83
End: 2023-08-08

## 2023-08-08 NOTE — CARE COORDINATION
Melissa MALONE heard back from [facility] with Shimon Lyn. She received everything and has submitted referral to insurance. Awaiting response. MELISSA MALONE will follow.

## 2023-08-08 NOTE — CARE COORDINATION
Spoke with the patient's niece Noah Griffin  [phone]. PURA updated her on the process from [facility] for SNF/Longterm care. She expressed understanding and is the main contact for the patient.

## 2023-08-10 ENCOUNTER — TELEPHONE (OUTPATIENT)
Dept: PRIMARY CARE CLINIC | Facility: CLINIC | Age: 83
End: 2023-08-10

## 2023-08-10 ENCOUNTER — CARE COORDINATION (OUTPATIENT)
Dept: CARE COORDINATION | Facility: CLINIC | Age: 83
End: 2023-08-10

## 2023-08-10 NOTE — CARE COORDINATION
Reached out to Tammy at [facility] to check status of auth for STR. Still pending. LANG MALONE will follow.

## 2023-08-11 ENCOUNTER — CARE COORDINATION (OUTPATIENT)
Dept: CARE COORDINATION | Facility: CLINIC | Age: 83
End: 2023-08-11

## 2023-08-11 NOTE — CARE COORDINATION
Per Tammy, pt may be approved for Monday. However, pt still does not have PPD or Covid test.  She will need those prior to admission.  Still waiting on confirmation of insurance approval.

## 2023-08-11 NOTE — CARE COORDINATION
Left VM with pt's niece requesting that she schedule PPD and Covid test for pt. SW CM will reach out next week.

## 2023-08-11 NOTE — TELEPHONE ENCOUNTER
Called patient phone number not working. Called the pharmacy to let them know that whom ever picks up her medication to ask them to bring her meter to see if they have strips to match her meter.

## 2023-08-14 ENCOUNTER — CARE COORDINATION (OUTPATIENT)
Dept: CARE COORDINATION | Facility: CLINIC | Age: 83
End: 2023-08-14

## 2023-08-14 NOTE — CARE COORDINATION
[facility] with Shimon Nicholson reached out and stated that insurance needs an order from PCP for inpatient PT/OT/RN. PCP office notified of request and LANG MALONE also working with pt notified. Awaiting order. Once it is in chart, LANG MALONE will fax it to Shimon Nicholson.

## 2023-08-15 ENCOUNTER — TELEPHONE (OUTPATIENT)
Dept: PRIMARY CARE CLINIC | Facility: CLINIC | Age: 83
End: 2023-08-15

## 2023-08-15 ENCOUNTER — CARE COORDINATION (OUTPATIENT)
Dept: CARE COORDINATION | Facility: CLINIC | Age: 83
End: 2023-08-15

## 2023-08-15 DIAGNOSIS — E11.21 TYPE 2 DIABETES MELLITUS WITH NEPHROPATHY (HCC): Primary | ICD-10-CM

## 2023-08-15 DIAGNOSIS — I10 PRIMARY HYPERTENSION: ICD-10-CM

## 2023-08-15 DIAGNOSIS — Z60.2 LIVES ALONE WITHOUT HELP AVAILABLE: ICD-10-CM

## 2023-08-15 SDOH — SOCIAL STABILITY - SOCIAL INSECURITY: PROBLEMS RELATED TO LIVING ALONE: Z60.2

## 2023-08-15 NOTE — CARE COORDINATION
Order placed for inpatient STR. LANG MALONE faxed order to Tammy at [facility]. Tammy notified of fax. Awaiting response regarding admission. Pt still needs covid test and PPD.

## 2023-08-15 NOTE — TELEPHONE ENCOUNTER
Pt is given orders for inpatient rehab, PT/OT/RN at [facility] due to inabllity to walk, to care for self, diabetes, weakness, hypertension, hyperlipidemia. Referral orders are given.
face

## 2023-08-16 ENCOUNTER — CARE COORDINATION (OUTPATIENT)
Dept: CARE COORDINATION | Facility: CLINIC | Age: 83
End: 2023-08-16

## 2023-08-17 ENCOUNTER — CARE COORDINATION (OUTPATIENT)
Dept: CARE COORDINATION | Facility: CLINIC | Age: 83
End: 2023-08-17

## 2023-08-17 NOTE — CARE COORDINATION
[place] [employer] denied pt for STRRonald Downing now proceeding with LTC via Medicaid. Pt is active Medicaid recipient. Level of Care referral submitted. Kelsey Arenas will reach out to family.

## 2023-08-18 ENCOUNTER — CARE COORDINATION (OUTPATIENT)
Dept: CARE COORDINATION | Facility: CLINIC | Age: 83
End: 2023-08-18

## 2023-08-18 NOTE — CARE COORDINATION
Reached out to Tammy with [facility] to inquire if business office spoke to family. Awaiting response. PPD scheduled.

## 2023-08-21 ENCOUNTER — CARE COORDINATION (OUTPATIENT)
Dept: CARE COORDINATION | Facility: CLINIC | Age: 83
End: 2023-08-21

## 2023-08-21 ENCOUNTER — NURSE ONLY (OUTPATIENT)
Dept: PRIMARY CARE CLINIC | Facility: CLINIC | Age: 83
End: 2023-08-21

## 2023-08-21 DIAGNOSIS — I10 PRIMARY HYPERTENSION: ICD-10-CM

## 2023-08-21 DIAGNOSIS — Z11.1 SCREENING FOR TUBERCULOSIS: Primary | ICD-10-CM

## 2023-08-21 RX ORDER — HYDROCHLOROTHIAZIDE 12.5 MG/1
12.5 CAPSULE, GELATIN COATED ORAL EVERY MORNING
Qty: 90 CAPSULE | Refills: 3 | Status: SHIPPED | OUTPATIENT
Start: 2023-08-21

## 2023-08-21 NOTE — CARE COORDINATION
LANG [facility] She had some questions about pt and will reach out to pt's niece as well. LANG MALONE will continue to follow. TB test will be read 8.23.23. LTC at [facility] still the plan.

## 2023-08-22 ENCOUNTER — CARE COORDINATION (OUTPATIENT)
Dept: CARE COORDINATION | Facility: CLINIC | Age: 83
End: 2023-08-22

## 2023-08-22 NOTE — CARE COORDINATION
[address] [place] [address] with Shimon Nicholson notified that PPD read is tomorrow. Also, CLTC nurse assigned and level of care should be completed this week.

## 2023-08-23 ENCOUNTER — CARE COORDINATION (OUTPATIENT)
Dept: CARE COORDINATION | Facility: CLINIC | Age: 83
End: 2023-08-23

## 2023-08-23 ENCOUNTER — NURSE ONLY (OUTPATIENT)
Dept: PRIMARY CARE CLINIC | Facility: CLINIC | Age: 83
End: 2023-08-23

## 2023-08-23 ENCOUNTER — TELEPHONE (OUTPATIENT)
Dept: PRIMARY CARE CLINIC | Facility: CLINIC | Age: 83
End: 2023-08-23

## 2023-08-23 DIAGNOSIS — F41.9 ANXIETY: ICD-10-CM

## 2023-08-23 DIAGNOSIS — Z11.1 TUBERCULOSIS SCREENING: Primary | ICD-10-CM

## 2023-08-23 RX ORDER — DIAZEPAM 5 MG/1
5 TABLET ORAL EVERY 12 HOURS PRN
Qty: 60 TABLET | Refills: 2 | Status: SHIPPED | OUTPATIENT
Start: 2023-08-23 | End: 2023-11-21

## 2023-08-23 NOTE — CARE COORDINATION
LANG MALONE reached out to [address] [place] [address] with CLTC for status of LOC. She stated that it should be complete tomorrow. [address] contact information given to [address] [place] [address]. Order faxed to [facility] for inpatient care. LANG MALONE put in a request for PCP office to fax current med list to [facility] at [facility] as well.

## 2023-08-25 ENCOUNTER — CARE COORDINATION (OUTPATIENT)
Dept: CARE COORDINATION | Facility: CLINIC | Age: 83
End: 2023-08-25

## 2023-08-25 NOTE — CARE COORDINATION
Melissa received level of care but requested med list and order be resent to a different fax number. [phone]. PCP office notified of request. Pt can admit Monday to [facility] if all documents are received by then.

## 2023-08-28 ENCOUNTER — CARE COORDINATION (OUTPATIENT)
Dept: CARE COORDINATION | Facility: CLINIC | Age: 83
End: 2023-08-28

## 2023-08-28 ENCOUNTER — APPOINTMENT (OUTPATIENT)
Dept: GENERAL RADIOLOGY | Age: 83
End: 2023-08-28
Payer: MEDICARE

## 2023-08-28 ENCOUNTER — HOSPITAL ENCOUNTER (EMERGENCY)
Age: 83
Discharge: HOME | End: 2023-08-28
Attending: EMERGENCY MEDICINE
Payer: MEDICARE

## 2023-08-28 VITALS
RESPIRATION RATE: 18 BRPM | WEIGHT: 192 LBS | TEMPERATURE: 98.1 F | HEIGHT: 63 IN | BODY MASS INDEX: 34.02 KG/M2 | SYSTOLIC BLOOD PRESSURE: 111 MMHG | OXYGEN SATURATION: 98 % | HEART RATE: 78 BPM | DIASTOLIC BLOOD PRESSURE: 66 MMHG

## 2023-08-28 DIAGNOSIS — E86.0 DEHYDRATION: ICD-10-CM

## 2023-08-28 DIAGNOSIS — I95.1 ORTHOSTASIS: Primary | ICD-10-CM

## 2023-08-28 LAB
ALBUMIN SERPL-MCNC: 3.9 G/DL (ref 3.2–4.6)
ALBUMIN/GLOB SERPL: 0.9 (ref 0.4–1.6)
ALP SERPL-CCNC: 53 U/L (ref 50–136)
ALT SERPL-CCNC: 17 U/L (ref 12–65)
ANION GAP SERPL CALC-SCNC: 6 MMOL/L (ref 2–11)
AST SERPL-CCNC: 12 U/L (ref 15–37)
BACTERIA URNS QL MICRO: NEGATIVE /HPF
BASOPHILS # BLD: 0.1 K/UL (ref 0–0.2)
BASOPHILS NFR BLD: 1 % (ref 0–2)
BILIRUB SERPL-MCNC: 0.6 MG/DL (ref 0.2–1.1)
BUN SERPL-MCNC: 28 MG/DL (ref 8–23)
CALCIUM SERPL-MCNC: 11.9 MG/DL (ref 8.3–10.4)
CASTS URNS QL MICRO: ABNORMAL /LPF
CHLORIDE SERPL-SCNC: 105 MMOL/L (ref 101–110)
CO2 SERPL-SCNC: 28 MMOL/L (ref 21–32)
CREAT SERPL-MCNC: 1.6 MG/DL (ref 0.6–1)
DIFFERENTIAL METHOD BLD: ABNORMAL
EKG ATRIAL RATE: 0 BPM
EKG DIAGNOSIS: NORMAL
EKG Q-T INTERVAL: 416 MS
EKG QRS DURATION: 149 MS
EKG QTC CALCULATION (BAZETT): 459 MS
EKG R AXIS: -51 DEGREES
EKG T AXIS: -37 DEGREES
EKG VENTRICULAR RATE: 73 BPM
EOSINOPHIL # BLD: 0 K/UL (ref 0–0.8)
EOSINOPHIL NFR BLD: 0 % (ref 0.5–7.8)
EPI CELLS #/AREA URNS HPF: ABNORMAL /HPF
ERYTHROCYTE [DISTWIDTH] IN BLOOD BY AUTOMATED COUNT: 12.6 % (ref 11.9–14.6)
GLOBULIN SER CALC-MCNC: 4.3 G/DL (ref 2.8–4.5)
GLUCOSE SERPL-MCNC: 228 MG/DL (ref 65–100)
HCT VFR BLD AUTO: 44.5 % (ref 35.8–46.3)
HGB BLD-MCNC: 14.9 G/DL (ref 11.7–15.4)
IMM GRANULOCYTES # BLD AUTO: 0 K/UL (ref 0–0.5)
IMM GRANULOCYTES NFR BLD AUTO: 0 % (ref 0–5)
LIPASE SERPL-CCNC: 148 U/L (ref 73–393)
LYMPHOCYTES # BLD: 2.8 K/UL (ref 0.5–4.6)
LYMPHOCYTES NFR BLD: 26 % (ref 13–44)
MCH RBC QN AUTO: 31.2 PG (ref 26.1–32.9)
MCHC RBC AUTO-ENTMCNC: 33.5 G/DL (ref 31.4–35)
MCV RBC AUTO: 93.1 FL (ref 82–102)
MONOCYTES # BLD: 0.9 K/UL (ref 0.1–1.3)
MONOCYTES NFR BLD: 8 % (ref 4–12)
NEUTS SEG # BLD: 6.9 K/UL (ref 1.7–8.2)
NEUTS SEG NFR BLD: 65 % (ref 43–78)
NRBC # BLD: 0 K/UL (ref 0–0.2)
PLATELET # BLD AUTO: 307 K/UL (ref 150–450)
PMV BLD AUTO: 9.9 FL (ref 9.4–12.3)
POTASSIUM SERPL-SCNC: 3.8 MMOL/L (ref 3.5–5.1)
PROT SERPL-MCNC: 8.2 G/DL (ref 6.3–8.2)
RBC # BLD AUTO: 4.78 M/UL (ref 4.05–5.2)
RBC #/AREA URNS HPF: ABNORMAL /HPF
SODIUM SERPL-SCNC: 139 MMOL/L (ref 133–143)
WBC # BLD AUTO: 10.6 K/UL (ref 4.3–11.1)
WBC URNS QL MICRO: ABNORMAL /HPF

## 2023-08-28 PROCEDURE — 71045 X-RAY EXAM CHEST 1 VIEW: CPT

## 2023-08-28 PROCEDURE — 2580000003 HC RX 258: Performed by: EMERGENCY MEDICINE

## 2023-08-28 PROCEDURE — 81015 MICROSCOPIC EXAM OF URINE: CPT

## 2023-08-28 PROCEDURE — 85025 COMPLETE CBC W/AUTO DIFF WBC: CPT

## 2023-08-28 PROCEDURE — 93010 ELECTROCARDIOGRAM REPORT: CPT | Performed by: INTERNAL MEDICINE

## 2023-08-28 PROCEDURE — 99285 EMERGENCY DEPT VISIT HI MDM: CPT

## 2023-08-28 PROCEDURE — 96360 HYDRATION IV INFUSION INIT: CPT

## 2023-08-28 PROCEDURE — 96361 HYDRATE IV INFUSION ADD-ON: CPT

## 2023-08-28 PROCEDURE — 80053 COMPREHEN METABOLIC PANEL: CPT

## 2023-08-28 PROCEDURE — 93005 ELECTROCARDIOGRAM TRACING: CPT | Performed by: EMERGENCY MEDICINE

## 2023-08-28 PROCEDURE — 83690 ASSAY OF LIPASE: CPT

## 2023-08-28 RX ORDER — 0.9 % SODIUM CHLORIDE 0.9 %
1000 INTRAVENOUS SOLUTION INTRAVENOUS
Status: COMPLETED | OUTPATIENT
Start: 2023-08-28 | End: 2023-08-28

## 2023-08-28 RX ADMIN — SODIUM CHLORIDE 1000 ML: 9 INJECTION, SOLUTION INTRAVENOUS at 10:31

## 2023-08-28 ASSESSMENT — PAIN DESCRIPTION - LOCATION: LOCATION: ABDOMEN

## 2023-08-28 ASSESSMENT — PAIN DESCRIPTION - ORIENTATION: ORIENTATION: LOWER

## 2023-08-28 ASSESSMENT — PAIN - FUNCTIONAL ASSESSMENT: PAIN_FUNCTIONAL_ASSESSMENT: 0-10

## 2023-08-28 ASSESSMENT — PAIN SCALES - GENERAL: PAINLEVEL_OUTOF10: 4

## 2023-08-28 ASSESSMENT — PAIN DESCRIPTION - FREQUENCY: FREQUENCY: INTERMITTENT

## 2023-08-28 NOTE — ED PROVIDER NOTES
Emergency Department Provider Note       PCP: Bessie Henry MD   Age: 78 y.o. Sex: female     DISPOSITION Decision To Discharge 08/28/2023 12:53:33 PM       ICD-10-CM    1. Orthostasis  I95.1       2. Dehydration  E86.0           Medical Decision Making     Complexity of Problems Addressed:  1 or more acute illnesses that pose a threat to life or bodily function. Data Reviewed and Analyzed:   I independently ordered and reviewed each unique test.  I reviewed external records: provider visit note from PCP. I reviewed external records: provider visit note from outside specialist.   The patients assessment required an independent historian: EMS, caregiver. The reason they were needed is important historical information not provided by the patient. I independently ordered and interpreted the ED EKG in the absence of a Cardiologist.    Rate: 76  EKG Interpretation: EKG Interpretation: right bundle branch block  ST Segments: Normal ST segments - NO STEMI  I independently interpreted the cardiac monitor rhythm strip NSR. I interpreted the X-rays no PNA. Discussion of management or test interpretation. Evaluation is negative for signs of infection Labs are consistent with dehydration. She has decreased skin turgor dry mucous membranes a slight bump in her BUN and creatinine. She is received a liter of fluid. Her orthostasis has improved. She has been seen by case management here in the emergency department and the family has been working to get her placed in rehab. She has a bed available at [facility]. We will plan to discharge her to her rehab bed      History      Reece Waters is a 78 y.o. female who presents to the Emergency Department with chief complaint of    Chief Complaint   Patient presents with    Fatigue      78-year-old female arrived to the emergency department by EMS for evaluation of near syncope.   Per EMS and the patient, this morning she was laying in bed when she stood

## 2023-08-28 NOTE — ED TRIAGE NOTES
Pt arrives via EMS from home with near syncopal episode this AM. Pt hypotensive with EMS 80/50. Pt states thinks she may be dehydrated, not been able to keep anything down. Denies LOC, SOB, N/V, Diarrhea.

## 2023-08-28 NOTE — CARE COORDINATION
Patient has been working to get into placement at [facility] prior to today's events that brought her to the ED per outpatient . Patient will be discharged from the ED. Call to Tammy at [facility] who is provided with updated information from today's visit including chest xray that reads no TB. Patient will go to room 310 and RN can call report to [phone]. Family declines to transport patient and request ambulance transport.  will coordinate transport.

## 2023-08-28 NOTE — CARE COORDINATION
PURA called and spoke with ED nurses station [facility]) and ask that the call PURA back to discuss patient and possibly going to [facility] for long-term care. PURA also called patient's Niece Jose to update her about possible placement. Jose states that the patient had an PPD last week, but it's no where in the chart.

## 2023-08-28 NOTE — CARE COORDINATION
Melissa from [facility] reached out and is only missing PPD. Pt can admit once the PPD is received. Pt now in ED-can possibly from ED to [facility].   Will follow up regarding copy of PPD

## 2023-08-28 NOTE — ED NOTES
Report given to [facility] RN at facility sending pt too. Transfer of care at this time.      Alejandra Dance, RN  08/28/23 2002

## 2023-08-29 ENCOUNTER — CARE COORDINATION (OUTPATIENT)
Dept: CARE COORDINATION | Facility: CLINIC | Age: 83
End: 2023-08-29

## 2023-08-29 NOTE — CARE COORDINATION
Per Tammy at [facility], pt is settled. No further needs at this time.   SW CM removed from care team.

## 2023-10-12 ENCOUNTER — TELEPHONE (OUTPATIENT)
Dept: PRIMARY CARE CLINIC | Facility: CLINIC | Age: 83
End: 2023-10-12

## 2023-10-12 NOTE — TELEPHONE ENCOUNTER
Called pt to reschedule 10/23/23 appt due to Dr. Tracie Marsh being ooo -  did not answer - lvm to call back to reschedule    Sending [facility] message and letter